# Patient Record
Sex: FEMALE | Race: WHITE | NOT HISPANIC OR LATINO | Employment: FULL TIME | URBAN - METROPOLITAN AREA
[De-identification: names, ages, dates, MRNs, and addresses within clinical notes are randomized per-mention and may not be internally consistent; named-entity substitution may affect disease eponyms.]

---

## 2019-05-08 ENCOUNTER — OFFICE VISIT (OUTPATIENT)
Dept: ORTHOPEDIC SURGERY | Facility: CLINIC | Age: 59
End: 2019-05-08

## 2019-05-08 VITALS — HEART RATE: 98 BPM | OXYGEN SATURATION: 98 % | WEIGHT: 163.14 LBS | HEIGHT: 61 IN | BODY MASS INDEX: 30.8 KG/M2

## 2019-05-08 DIAGNOSIS — M77.42 METATARSALGIA OF LEFT FOOT: ICD-10-CM

## 2019-05-08 DIAGNOSIS — M21.6X2 ACQUIRED METATARSUS VARUS OF LEFT FOOT: ICD-10-CM

## 2019-05-08 DIAGNOSIS — M79.672 LEFT FOOT PAIN: Primary | ICD-10-CM

## 2019-05-08 DIAGNOSIS — I87.8 VENOUS STASIS: ICD-10-CM

## 2019-05-08 PROCEDURE — 99203 OFFICE O/P NEW LOW 30 MIN: CPT | Performed by: ORTHOPAEDIC SURGERY

## 2019-05-08 RX ORDER — DICLOFENAC SODIUM 75 MG/1
75 TABLET, DELAYED RELEASE ORAL 2 TIMES DAILY
Refills: 1 | COMMUNITY
Start: 2019-03-10 | End: 2019-08-21

## 2019-05-08 RX ORDER — ESTRADIOL 0.05 MG/D
FILM, EXTENDED RELEASE TRANSDERMAL
Refills: 2 | COMMUNITY
Start: 2019-03-26 | End: 2023-01-03 | Stop reason: ALTCHOICE

## 2019-05-08 RX ORDER — CHLORAL HYDRATE 500 MG
1000 CAPSULE ORAL
COMMUNITY

## 2019-05-08 NOTE — PROGRESS NOTES
NEW PATIENT    Patient: Filomena Nixon  : 1960    Primary Care Provider: Kaushal Ledbetter MD    Requesting Provider: As above    Pain of the Left Foot      History    Chief Complaint: Left foot pain    History of Present Illness: This is a very pleasant 59-year-old woman who I am seen in the past for plantar fasciitis, .  She is here with a new problem.  She has a 2-month history of pain under the left forefoot.  She reports it feels as if it is swollen, it feels as if she is walking on something.  It is worse barefoot, it is better with padding.  She has not had any particular treatment.  She wears good shoes.  She rates the pain as 4-5 out of 10.  No history of injury, no change in activity.  She is an .    Current Outpatient Medications on File Prior to Visit   Medication Sig Dispense Refill   • aspirin 81 MG tablet aspirin 81 mg tablet   Daily     • diclofenac (VOLTAREN) 75 MG EC tablet Take 75 mg by mouth 2 (Two) Times a Day.  1   • estradiol (MINIVELLE, VIVELLE-DOT) 0.05 MG/24HR patch APPLY PATCH TWICE WEEKLY  2   • Misc Natural Products (OSTEO BI-FLEX JOINT SHIELD PO) Take  by mouth.     • MULTIPLE VITAMIN PO Multiple Vitamin capsule   Daily     • Omega-3 Fatty Acids (FISH OIL) 1000 MG capsule capsule Take  by mouth Daily With Breakfast.     • OMEPRAZOLE PO omeprazole 20 mg capsule,delayed release   one po daily     • Psyllium (GENFIBER PO) Genfiber (psyllium-dextrose) oral powder   As Directed     • TURMERIC PO Take  by mouth.       No current facility-administered medications on file prior to visit.       Allergies   Allergen Reactions   • Codeine Hives      Past Medical History:   Diagnosis Date   • Osteoarthritis      Past Surgical History:   Procedure Laterality Date   • HYSTERECTOMY     • KNEE SURGERY Left     arthroscopy     Family History   Problem Relation Age of Onset   • Osteoarthritis Mother    • Diabetes Mother    • Hypertension Father       Social History  "    Socioeconomic History   • Marital status:      Spouse name: Not on file   • Number of children: Not on file   • Years of education: Not on file   • Highest education level: Not on file   Tobacco Use   • Smoking status: Never Smoker   • Smokeless tobacco: Never Used   Substance and Sexual Activity   • Alcohol use: Yes   • Drug use: No   • Sexual activity: Defer        Review of Systems   Constitutional: Negative.    HENT: Negative.    Eyes: Negative.    Respiratory: Negative.    Cardiovascular: Negative.    Gastrointestinal: Negative.    Endocrine: Negative.    Genitourinary: Negative.    Musculoskeletal: Positive for arthralgias and joint swelling.   Skin: Negative.    Allergic/Immunologic: Negative.    Neurological: Negative.    Hematological: Negative.    Psychiatric/Behavioral: Negative.        The following portions of the patient's history were reviewed and updated as appropriate: allergies, current medications, past family history, past medical history, past social history, past surgical history and problem list.    Physical Exam:   Pulse 98   Ht 154.9 cm (61\")   Wt 74 kg (163 lb 2.3 oz)   SpO2 98%   BMI 30.83 kg/m²   GENERAL: Body habitus: overweight    Lower extremity edema: Right: trace; Leftt: trace    Varicose veins:  Right: mild; Left: mild    Gait: normal     Mental Status:  awake and alert; oriented to person, place, and time    Voice:  clear  SKIN:  Normal and warm and dry    Hair Growth:  Right:normal; Left:  normal  NAILS: Toenails: normal  HEENT: Head: Normocephalic, atraumatic,  without obvious abnormality.  eye: normal external eye, no icterus  ears: normal external ears  nose: normal external nose  pharynx: dental hygiene adequate  PULM:  Repiratory effort normal  CV:  Dorsalis Pedis:  Right: 2+; Left:2+    Posterior Tibial: Right:2+; Left:2+    Capillary Refill:  Brisk  MSK:  Hand:left handed and sensation intact      Tibia:  Right:  tender over subcutaneous border; Left:  tender " over subcutaneous border      Ankle:  Right: non tender, ROM  normal and symmetric and motor function  normal; Left:  non tender, ROM  normal and symmetric and motor function  normal      Foot:  Right:  non tender, ROM  normal and motor function  normal; Left:  Tender in the left second metatarsal phalangeal joint with swelling, mildly tender with some swelling in the third.  No significant hammertoe deformity.  Mild hallux valgus metatarsus primus varus, no tenderness over the bunion.  No other tenderness.      NEURO: Heel Walking:  Right:  normal; Left:  normal    Toe Walking:  Right:  normal; Left:  limited ability, painful     [place]-Mario 5.07 monofilament test: normal    Lower extremity sensation: intact     Reflexes:  Biceps:  Right:  1+; Left:  1+           Quads:  Right:  2+; Left:  2+           Ankle:  Right:  1+; Left:  1+      Calf Atrophy:none    Motor Function: all 5/5         Medical Decision Making    Data Review:   ordered and reviewed x-rays today    Assessment and Plan/ Diagnosis/Treatment options:   1. Metatarsalgia of left foot  She has synovitis of the left second and third metatarsal phalangeal joints.  We call this early hammertoe problem.  I explained to the patient that this is a very common problem, it is commonly hereditary but is made worse by shoe wear.  It commonly presents as a feeling of walking on a marble, or a rock.  It is generally worse barefoot, feels better with padding .  I can be hard to distinguish this from neuroma pain, but neuroma pain is generally worse in a shoe and better barefoot.    If untreated, it can lead to severe deformity of the toe.      I explained the problem to the patient, that the swelling in the joint leads to loosening of the ligaments and gradual deformity.  I explained that the toe can move directly up or sideways and become a crossover toe.  I explained we can call this synovitis, metatarsalgia, early hammertoe, early crossover toe, the result  is the same.  The goal of treatment is to prevent severe deformity.  We cannot change the deformity that has already occurred, but we can keep it from getting worse.  I explained that if we did an MRI, we would see stress in the bones on both sides of the joint, we would see fluid in the joint, and we might even see tears in the plantar plate.  However this does not change treatment.  Therefore I do not think an MRI is indicated.    I explained it can take 6-12 months for the pain to resolve.  When the pain is gone, the toe will no longer deform.    The treatment is done in a step-wise fashion.  The first stage is three fold: 1. splinting the joint, I showed them how to use either tape or a 2 loop Budin splint.  They should experiment and use whichever is comfortable.  2. custom orthotic to relieve pressure on the joint- I gave them a prescription 3. antiinflammatory to decrease the inflammation.      The second stage is a cortisone injection I do not do this the first time because of the increased risk that the toe will dislocate after injection.  I explained that sometimes these joints can spontaneously dislocate.   .      Surgery is only a very last resort.    I will see her in 3 months with standing 2 views of the foot    2. Acquired metatarsus varus of left foot  She does have a bunion but it is not painful    3. Venous stasis  She should wear support stockings daily, we discussed what type and where to find him

## 2019-08-21 ENCOUNTER — OFFICE VISIT (OUTPATIENT)
Dept: ORTHOPEDIC SURGERY | Facility: CLINIC | Age: 59
End: 2019-08-21

## 2019-08-21 VITALS — HEIGHT: 61 IN | BODY MASS INDEX: 31.15 KG/M2 | WEIGHT: 165 LBS

## 2019-08-21 DIAGNOSIS — M77.42 METATARSALGIA OF LEFT FOOT: ICD-10-CM

## 2019-08-21 DIAGNOSIS — I87.8 VENOUS STASIS: ICD-10-CM

## 2019-08-21 DIAGNOSIS — M21.6X2 ACQUIRED METATARSUS VARUS OF LEFT FOOT: Primary | ICD-10-CM

## 2019-08-21 PROCEDURE — 99212 OFFICE O/P EST SF 10 MIN: CPT | Performed by: ORTHOPAEDIC SURGERY

## 2019-08-21 RX ORDER — MELOXICAM 7.5 MG/1
7.5 TABLET ORAL 2 TIMES DAILY
COMMUNITY
End: 2021-10-06

## 2019-08-21 NOTE — PROGRESS NOTES
ESTABLISHED PATIENT    Patient: Filomena Nixon  : 1960    Primary Care Provider: Kaushal Ledbetter MD    Requesting Provider: As above    Follow-up (3 month recheck - Metatarsalgia of left foot;  Acquired metatarsus varus of left foot; Venous Statis)      History    Chief Complaint: Left foot pain    History of Present Illness: She returns reporting she is much improved.  She only wears the Budin splint intermittently when it sore.  Overall most of her pain is gone.    Current Outpatient Medications on File Prior to Visit   Medication Sig Dispense Refill   • aspirin 81 MG tablet aspirin 81 mg tablet   Daily     • estradiol (MINIVELLE, VIVELLE-DOT) 0.05 MG/24HR patch APPLY PATCH TWICE WEEKLY  2   • meloxicam (MOBIC) 7.5 MG tablet Take 7.5 mg by mouth 2 (Two) Times a Day.     • Misc Natural Products (OSTEO BI-FLEX JOINT SHIELD PO) Take  by mouth.     • MULTIPLE VITAMIN PO Multiple Vitamin capsule   Daily     • Omega-3 Fatty Acids (FISH OIL) 1000 MG capsule capsule Take  by mouth Daily With Breakfast.     • OMEPRAZOLE PO omeprazole 20 mg capsule,delayed release   one po daily     • Psyllium (GENFIBER PO) Genfiber (psyllium-dextrose) oral powder   As Directed     • TURMERIC PO Take  by mouth.     • [DISCONTINUED] diclofenac (VOLTAREN) 75 MG EC tablet Take 75 mg by mouth 2 (Two) Times a Day.  1     No current facility-administered medications on file prior to visit.       Allergies   Allergen Reactions   • Codeine Hives      Past Medical History:   Diagnosis Date   • Osteoarthritis      Past Surgical History:   Procedure Laterality Date   • HYSTERECTOMY     • KNEE SURGERY Left     arthroscopy     Family History   Problem Relation Age of Onset   • Osteoarthritis Mother    • Diabetes Mother    • Hypertension Father       Social History     Socioeconomic History   • Marital status:      Spouse name: Not on file   • Number of children: Not on file   • Years of education: Not on file   • Highest education level:  "Not on file   Tobacco Use   • Smoking status: Never Smoker   • Smokeless tobacco: Never Used   Substance and Sexual Activity   • Alcohol use: Yes   • Drug use: No   • Sexual activity: Defer        Review of Systems   Constitutional: Negative.    HENT: Negative.    Eyes: Negative.    Respiratory: Negative.    Cardiovascular: Negative.    Gastrointestinal: Negative.    Endocrine: Negative.    Genitourinary: Negative.    Musculoskeletal: Positive for arthralgias.   Skin: Negative.    Allergic/Immunologic: Negative.    Neurological: Negative.    Hematological: Negative.    Psychiatric/Behavioral: Negative.        The following portions of the patient's history were reviewed and updated as appropriate: allergies, current medications, past family history, past medical history, past social history, past surgical history and problem list.    Physical Exam:   Ht 154.9 cm (60.98\")   Wt 74.8 kg (165 lb)   BMI 31.19 kg/m²   GENERAL: Body habitus: overweight    Lower extremity edema: Left: 1+ pitting; Right: 1+ pitting    Gait: normal     Mental Status:  awake and alert; oriented to person, place, and time  MSK:      Foot:  Right:  non tender; Left:  No tenderness in the left foot, no tenderness in the second MTPJ    NEURO Sensation:  intact    Medical Decision Making    Data Review:   ordered and reviewed x-rays today    Assessment/Plan/Diagnosis/Treatment Options:   1.  Metatarsalgia left foot  Most of her symptoms have resolved.  She has not developed any severe deformity.  I think it is okay to use the Budin splint as needed.  I will be happy to see her anytime      2.  Venous stasis, she really should wear support stockings daily                            "

## 2021-10-05 NOTE — PROGRESS NOTES
[facility]  [address]  [place] [address]  [phone]    Name: Filomena Nixon  : 1960  Patient Care Team:  Kaushal Ledbetter MD as PCP - General (Family Medicine)    Chief Complaint   Patient presents with   • Obesity   • Consult   • Nutrition Counseling        HPI:   Ms. Filomena Nixon is a 61 y.o. female presents for initiation of medically supervised weight loss. She has tried other methods/programs to lose weight including:  Weight Watchers and Joining a gym and has not ever tried medication to assist with weight loss..     Lifetime non-pregnant maximum weight: 181  Weight 1 year ago: 170  Weight 5 years ago: 140    The following seem to sabotage weight loss efforts:Lack of time for planning & self, comfort/stress eating, enjoyment of food, social events, eating late, waking up eating, liquid calories such as alcohol, mindless eating (snacking while working or watching TV), eating too fast, always hungry, boredom eating, specific cravings like carbohydrates and convenience food (fast food) (mixture of everything)  Recent Weight History:   Wt Readings from Last 6 Encounters:   10/06/21 76.9 kg (169 lb 8 oz)   19 74.8 kg (165 lb)   19 74 kg (163 lb 2.3 oz)       Review of Systems:   Review of Systems   Constitutional: Negative for fatigue.        Positive for weight gain   HENT: Negative for trouble swallowing.         Negative for throat swelling   Respiratory: Negative for shortness of breath and wheezing.         Negative for snoring   Cardiovascular: Negative for chest pain, palpitations and leg swelling.   Gastrointestinal: Positive for constipation, diarrhea, GERD and indigestion. Negative for abdominal pain.   Endocrine: Negative for cold intolerance, heat intolerance, polydipsia, polyphagia and polyuria.        Negative for loss of hair  Negative for hirsutism     Genitourinary:        Denies menstrual irregularities  "  Musculoskeletal: Negative for arthralgias.        Denies exercise limitations  Denies chronic pain   Skin: Negative for dry skin.        Negative for acne   Neurological: Negative for headache and memory problem.        Negative for paresthesias   Psychiatric/Behavioral: Positive for depressed mood. Negative for self-injury, sleep disturbance and suicidal ideas. The patient is nervous/anxious.        Allergies   Allergen Reactions   • Codeine Hives and Itching       Exercise:      Current FITT Score      Frequency   Intensity Time Strength Training   [x]   0 None  [x]   0 None  [x]   0 None  [x]   0 None    []   1 (1-2x/week) []   1 (light) []   1 (<10 min) []   1 (1x/week)   []   2 (3-5x/week) []   2 (moderate) []   2 (10-20 min) []   2 (2x/week)   []   3 (daily)   []   3 (moderately hard)  []   4 (very hard) []   3 (20-30 min)  []   4 (>30 min) []   3 (3-4x/week)       Water: 0oz/day  Alcohol: CAGE is positive   Other beverages:  coffee, diet soda, alcohol    PHQ-9 Total Score:   2  VS   Vitals:    10/06/21 0839   BP: 126/68   BP Location: Left arm   Patient Position: Sitting   Cuff Size: Adult   Pulse: 86   Resp: 18   Temp: 96.6 °F (35.9 °C)   TempSrc: Temporal   SpO2: 98%   Weight: 76.9 kg (169 lb 8 oz)   Height: 154.9 cm (61\")       Start Weight/BMI: 169.5  %fat: 42.8  Total body water (in lbs): 71.0    Measurements (in inches)  Neck: 13.5  Chest: 40  Waist: 36  Hips: 40.6  Thighs: 38    Physical Exam  Vitals reviewed.   Constitutional:       Appearance: She is obese. She is not ill-appearing.   HENT:      Head:      Comments: masked  Neck:      Thyroid: No thyroid mass, thyromegaly or thyroid tenderness.   Cardiovascular:      Rate and Rhythm: Normal rate and regular rhythm.   Pulmonary:      Effort: Pulmonary effort is normal.      Breath sounds: Normal breath sounds and air entry.   Neurological:      Mental Status: She is alert.   Psychiatric:         Attention and Perception: Attention and perception " normal.         Behavior: Behavior is cooperative.          ASSESSMENT/PLAN:   Patient viewed educational videos. Topics included obesity as a disease, nutritional education on food groups, exercise, and medications. Patient was instructed in adequate protein, controlled carb and controlled fat intake.   Patient received instructions on using the medicines as a tool in controlling their weight with nutritional and behavioral changes. Risks and benefits were discussed. I believe the potential benefits of medication helping to decrease weight outweighs the risks. Patient is to try nutritonal/behavioral changes only first.   Patient received our clinic education booklet.   Our patient consent form was reviewed including potential risks of weight loss. We also reviewed our confidentiality and HIPPA statements. Patients current FITT score was reviewed along with current capability for exercise tolerance and a patient will work towards a FITT score of:     Frequency   Intensity Time Strength Training   []   0 None  []   0 None  []   0 None  []   0 None    []   1 (1-2x/week) []   1 (light) []   1 (<10 min) [x]   1 (1x/week)   [x]   2 (3-5x/week) [x]   2 (moderate) []   2 (10-20 min) []   2 (2x/week)   []   3 (daily)   []   3 (moderately hard)  []   4 (very hard) []   3 (20-30 min)  []   4 (>30 min) []   3 (3-4x/week)       Patient's past medical history was reviewed in detail and barriers to weight loss were identified and discussed. Past efforts at weight reduction on their own as well as under physician supervision were documented and discussed.  I advised patient to continue routine care with their Primary Care Provider.     Nutritional recommendations and goals were reviewed including Calories: 1000 daily adjusted for exercise calories burnt, Protein:35% daily, Net carbs (total carb - fiber) 30% daily.    Diagnoses and all orders for this visit:    1. Obesity, Class I, BMI 30-34.9 (Primary)  Assessment &  Plan:  Patient's (Body mass index is 32.03 kg/m².) indicates that they are obese (BMI >30) with health conditions that include obstructive sleep apnea, dyslipidemias, GERD and pre-diabetes . Weight is newly identified. BMI is is above average; BMI management plan is completed.   --My fitness pal was set up in office she has previously used this before.  Current basal metabolic rate 1399 kcal.  Calorie goal set up to 1000 adjusted for activity and exercise.  #1 goal over the next 2 weeks is starting to get an accurate calorie count for the day.  Asked patient to start tracking with a goal of tracking 14 out of 14 days these next 2 weeks.  Patient asked to bring in food journal to next office visit for brief review.  --Medication eligibility form provided for patient and asked her to check coverage with her insurance.  In particular we are looking for coverage for GLP-1's as she does have prediabetes currently.   --Patient is somewhat interested in pharmacology.  Discussed that if we start this medication it would be at her 2-week follow-up after labs are reviewed.  --Fasting labs from PCP reviewed in office and additional fasting labs ordered to be drawn prior to next appointment.      Orders:  -     Comprehensive Metabolic Panel; Future  -     Insulin, Total; Future  -     Hemoglobin A1c; Future  -     Vitamin D 25 Hydroxy; Future    2. Pre-diabetes  -     Comprehensive Metabolic Panel; Future  -     Insulin, Total; Future  -     Hemoglobin A1c; Future  -     Vitamin D 25 Hydroxy; Future    3. Vitamin D deficiency  -     Comprehensive Metabolic Panel; Future  -     Insulin, Total; Future  -     Hemoglobin A1c; Future  -     Vitamin D 25 Hydroxy; Future    4. Encounter for drug screening  -     Urine Drug Screen - Urine, Clean Catch; Future       Treatment Plan  Non-Weight Loss Goals: Improved blood glucose: has been addressed., Improved cholesterol: has been addressed., Improved mobility: has been addressed. ,  Improved energy: has been addressed.  and Improved sleep apnea: has been addressed.   Initial goal of 5-10% loss of beginning body weight  Goals:  1. Personal Goals: become more active.   2. Start changing nutrition to examples given to meet nutritional macronutrient individual ranges discussed. Titrate down 500 calories every 5 days as tolerated until range met. Titrate down 50g carbohydrates every 5 days as tolerated until range met. Inc exercise to the individualized FITT score discussed. Start to keep a food journal and bring into next visit in 2 weeks for review. Practice the behavioral modification technique of mindful eating. Take one MVI daily and 2000mg fish oil daily. Take other medications and supplements as directed.    I spent 60 minutes on this date of service. This time includes time spent by me in the following activities:preparing for the visit, counseling and educating the patient/family/caregiver, ordering medications, tests, or procedures and documenting information in the medical record.    Return in about 4 weeks (around 11/3/2021).      JANETTE Stone

## 2021-10-06 ENCOUNTER — OFFICE VISIT (OUTPATIENT)
Dept: BARIATRICS/WEIGHT MGMT | Facility: CLINIC | Age: 61
End: 2021-10-06

## 2021-10-06 VITALS
TEMPERATURE: 96.6 F | DIASTOLIC BLOOD PRESSURE: 68 MMHG | OXYGEN SATURATION: 98 % | RESPIRATION RATE: 18 BRPM | HEIGHT: 61 IN | SYSTOLIC BLOOD PRESSURE: 126 MMHG | WEIGHT: 169.5 LBS | BODY MASS INDEX: 32 KG/M2 | HEART RATE: 86 BPM

## 2021-10-06 DIAGNOSIS — E66.9 OBESITY, CLASS I, BMI 30-34.9: Primary | ICD-10-CM

## 2021-10-06 DIAGNOSIS — E55.9 VITAMIN D DEFICIENCY: ICD-10-CM

## 2021-10-06 DIAGNOSIS — Z02.83 ENCOUNTER FOR DRUG SCREENING: ICD-10-CM

## 2021-10-06 DIAGNOSIS — R73.03 PRE-DIABETES: ICD-10-CM

## 2021-10-06 PROBLEM — E66.811 OBESITY, CLASS I, BMI 30-34.9: Status: ACTIVE | Noted: 2021-10-06

## 2021-10-06 PROBLEM — E78.5 HYPERLIPEMIA: Status: ACTIVE | Noted: 2021-10-06

## 2021-10-06 PROBLEM — G47.30 SLEEP APNEA: Status: ACTIVE | Noted: 2021-10-06

## 2021-10-06 PROBLEM — K21.9 GERD (GASTROESOPHAGEAL REFLUX DISEASE): Status: ACTIVE | Noted: 2021-10-06

## 2021-10-06 PROCEDURE — 99205 OFFICE O/P NEW HI 60 MIN: CPT | Performed by: NURSE PRACTITIONER

## 2021-10-06 RX ORDER — BETAMETHASONE DIPROPIONATE 0.5 MG/G
OINTMENT TOPICAL
COMMUNITY
End: 2022-02-28 | Stop reason: ALTCHOICE

## 2021-10-06 RX ORDER — CHOLECALCIFEROL (VITAMIN D3) 1250 MCG
1 CAPSULE ORAL DAILY
COMMUNITY

## 2021-10-06 RX ORDER — ATORVASTATIN CALCIUM 10 MG/1
10 TABLET, FILM COATED ORAL
COMMUNITY
Start: 2021-09-01

## 2021-10-06 RX ORDER — METHYLPREDNISOLONE 4 MG/1
4 TABLET ORAL DAILY
COMMUNITY
Start: 2021-09-30 | End: 2021-10-20

## 2021-10-06 RX ORDER — ETODOLAC 500 MG/1
500 TABLET, FILM COATED ORAL 2 TIMES DAILY
COMMUNITY
Start: 2021-09-07

## 2021-10-06 NOTE — ASSESSMENT & PLAN NOTE
Patient's (Body mass index is 32.03 kg/m².) indicates that they are obese (BMI >30) with health conditions that include obstructive sleep apnea, dyslipidemias, GERD and pre-diabetes . Weight is newly identified. BMI is is above average; BMI management plan is completed.   --My fitness pal was set up in office she has previously used this before.  Current basal metabolic rate 1399 kcal.  Calorie goal set up to 1000 adjusted for activity and exercise.  #1 goal over the next 2 weeks is starting to get an accurate calorie count for the day.  Asked patient to start tracking with a goal of tracking 14 out of 14 days these next 2 weeks.  Patient asked to bring in food journal to next office visit for brief review.  --Medication eligibility form provided for patient and asked her to check coverage with her insurance.  In particular we are looking for coverage for GLP-1's as she does have prediabetes currently.   --Patient is somewhat interested in pharmacology.  Discussed that if we start this medication it would be at her 2-week follow-up after labs are reviewed.  --Fasting labs from PCP reviewed in office and additional fasting labs ordered to be drawn prior to next appointment.

## 2021-10-07 ENCOUNTER — LAB (OUTPATIENT)
Dept: LAB | Facility: HOSPITAL | Age: 61
End: 2021-10-07

## 2021-10-07 DIAGNOSIS — R73.03 PRE-DIABETES: ICD-10-CM

## 2021-10-07 DIAGNOSIS — Z02.83 ENCOUNTER FOR DRUG SCREENING: ICD-10-CM

## 2021-10-07 DIAGNOSIS — E66.9 OBESITY, CLASS I, BMI 30-34.9: ICD-10-CM

## 2021-10-07 DIAGNOSIS — E55.9 VITAMIN D DEFICIENCY: ICD-10-CM

## 2021-10-07 LAB
25(OH)D3 SERPL-MCNC: 40.4 NG/ML (ref 30–100)
ALBUMIN SERPL-MCNC: 5.1 G/DL (ref 3.5–5.2)
ALBUMIN/GLOB SERPL: 2.3 G/DL
ALP SERPL-CCNC: 66 U/L (ref 39–117)
ALT SERPL W P-5'-P-CCNC: 21 U/L (ref 1–33)
AMPHET+METHAMPHET UR QL: NEGATIVE
AMPHETAMINES UR QL: NEGATIVE
ANION GAP SERPL CALCULATED.3IONS-SCNC: 11.6 MMOL/L (ref 5–15)
AST SERPL-CCNC: 16 U/L (ref 1–32)
BARBITURATES UR QL SCN: NEGATIVE
BENZODIAZ UR QL SCN: NEGATIVE
BILIRUB SERPL-MCNC: 1.1 MG/DL (ref 0–1.2)
BUN SERPL-MCNC: 17 MG/DL (ref 8–23)
BUN/CREAT SERPL: 18.3 (ref 7–25)
BUPRENORPHINE SERPL-MCNC: NEGATIVE NG/ML
CALCIUM SPEC-SCNC: 9.8 MG/DL (ref 8.6–10.5)
CANNABINOIDS SERPL QL: NEGATIVE
CHLORIDE SERPL-SCNC: 104 MMOL/L (ref 98–107)
CO2 SERPL-SCNC: 25.4 MMOL/L (ref 22–29)
COCAINE UR QL: NEGATIVE
CREAT SERPL-MCNC: 0.93 MG/DL (ref 0.57–1)
GFR SERPL CREATININE-BSD FRML MDRD: 61 ML/MIN/1.73
GLOBULIN UR ELPH-MCNC: 2.2 GM/DL
GLUCOSE SERPL-MCNC: 95 MG/DL (ref 65–99)
HBA1C MFR BLD: 5.77 % (ref 4.8–5.6)
METHADONE UR QL SCN: NEGATIVE
OPIATES UR QL: NEGATIVE
OXYCODONE UR QL SCN: NEGATIVE
PCP UR QL SCN: NEGATIVE
POTASSIUM SERPL-SCNC: 4.3 MMOL/L (ref 3.5–5.2)
PROPOXYPH UR QL: NEGATIVE
PROT SERPL-MCNC: 7.3 G/DL (ref 6–8.5)
SODIUM SERPL-SCNC: 141 MMOL/L (ref 136–145)
TRICYCLICS UR QL SCN: NEGATIVE

## 2021-10-07 PROCEDURE — 80053 COMPREHEN METABOLIC PANEL: CPT

## 2021-10-07 PROCEDURE — 83036 HEMOGLOBIN GLYCOSYLATED A1C: CPT

## 2021-10-07 PROCEDURE — 80306 DRUG TEST PRSMV INSTRMNT: CPT

## 2021-10-07 PROCEDURE — 82306 VITAMIN D 25 HYDROXY: CPT

## 2021-10-07 PROCEDURE — 36415 COLL VENOUS BLD VENIPUNCTURE: CPT

## 2021-10-07 PROCEDURE — 83525 ASSAY OF INSULIN: CPT

## 2021-10-08 LAB — INSULIN SERPL-ACNC: 9.8 UIU/ML (ref 2.6–24.9)

## 2021-10-11 PROBLEM — R73.09 ELEVATED HEMOGLOBIN A1C: Status: ACTIVE | Noted: 2021-10-11

## 2021-10-19 RX ORDER — SEMAGLUTIDE 1.34 MG/ML
0.25 INJECTION, SOLUTION SUBCUTANEOUS WEEKLY
Qty: 1 PEN | Refills: 0 | Status: CANCELLED | OUTPATIENT
Start: 2021-10-19 | End: 2021-11-18

## 2021-10-19 NOTE — PROGRESS NOTES
"[facility] [place] [facility]  [address]  [place] [address]    Name: Filomena Nixon  : 1960    Chief Complaint   Patient presents with   • Obesity   • Follow-up        Allergies   Allergen Reactions   • Codeine Hives and Itching       Vitals:    10/20/21 0724   BP: 122/64   BP Location: Left arm   Patient Position: Sitting   Cuff Size: Adult   Pulse: 88   Resp: 18   Temp: 97.8 °F (36.6 °C)   TempSrc: Temporal   SpO2: 98%   Weight: 76.4 kg (168 lb 8 oz)   Height: 154.9 cm (61\")       Patient is currently taking these medications prescribed by this office:  None. Initial follow up.     Recent Weight History:   Wt Readings from Last 6 Encounters:   10/20/21 76.4 kg (168 lb 8 oz)   10/06/21 76.9 kg (169 lb 8 oz)   19 74.8 kg (165 lb)   19 74 kg (163 lb 2.3 oz)       Last office visit weight (at [facility] visit) : 169.5  Change in weight since last visit: -1  Start Weight: 169.5  Total Loss%: -0.59  Loss of BBW: -1    Body composition analysis completed and showed:  %body fat: 40.5  Total fat mass (in lbs): 68.0  Fat free mass (in lbs): 100.5  Total body water (in lb): 73.5  BMR:1395    Measurements (in inches)  Waist: 36.5    The patient is exercising with a FITT score of:    Frequency   Intensity Time Strength Training   []   0 None  []   0 None  []   0 None  [x]   0 None    []   1 (1-2x/week) [x]   1 (light) []   1 (<10 min) []   1 (1x/week)   [x]   2 (3-5x/week) []   2 (moderate) [x]   2 (10-20 min) []   2 (2x/week)   []   3 (daily)   []   3 (moderately hard)  []   4 (very hard) []   3 (20-30 min)  []   4 (>30 min) []   3 (3-4x/week)       Office visit for Filomena Nixon, a 61 y.o. female, established patient for medical weight management. Patient is unsure with weight loss progress.    Hunger control has remained unchanged The patient is exercising. The patient is using a food journal. The patient is checking weight regularly. The patient rates current efforts " as 5 out of 10. Body mass index is 31.84 kg/m². and has not reached treatment goal.     Review of Systems:  Review of Systems   Constitutional: Negative for fatigue.   Eyes: Negative for visual disturbance.   Cardiovascular: Negative for chest pain and palpitations.   Gastrointestinal: Negative for abdominal pain, constipation, diarrhea, nausea and GERD.   Neurological: Negative for memory problem.        Parasthesias negative   Psychiatric/Behavioral: Negative for sleep disturbance and depressed mood. The patient is not nervous/anxious.        Physical Exam:  Physical Exam  Vitals reviewed.   Constitutional:       Appearance: She is well-developed.      Comments: overweight   HENT:      Head:      Comments: masked  Pulmonary:      Effort: Pulmonary effort is normal.   Neurological:      Mental Status: She is alert.   Psychiatric:         Attention and Perception: Attention normal.         Mood and Affect: Mood normal.         Speech: Speech normal.         Behavior: Behavior normal.          ASSESSMENT/PLAN:   Diagnoses and all orders for this visit:    1. Obesity, Class I, BMI 30-34.9 (Primary)  Assessment & Plan:  Patient's (Body mass index is 31.84 kg/m².) indicates that they are obese (BMI >30) with health conditions that include obstructive sleep apnea, dyslipidemias, GERD, osteoarthritis and pre-diabetes . Weight is newly identified.   --Patient did do a great job on tracking tracking every day the past 2 weeks.  On a brief review we did notice that she is staying within her calorie goal.  One thing she plans to work on this coming month is to reach her protein goal.  --Currently having some right knee swelling with likely MRI which is restricting her exercise currently.  --We started Saxenda at this appointment and sample was provided with injection instructions at office.  [facility] and Zofran sent to [facility] of her choice sample lot number  [id number] expiration 4/2023    Orders:  -      Liraglutide (SAXENDA) 18 MG/3ML injection pen; Inject 3 mg under the skin into the appropriate area as directed Daily for 30 days. Titrate up an instructed starting at 0.6mg daily  Dispense: 5 pen; Refill: 0  -     Insulin Pen Needle (Pen Needles) 32G X 4 MM misc; 1 each As Needed (with medication) for up to 100 days.  Dispense: 100 each; Refill: 0    2. Elevated hemoglobin A1c    Other orders  -     ondansetron (Zofran) 8 MG tablet; Take 1 tablet by mouth Every 8 (Eight) Hours As Needed for Nausea or Vomiting for up to 30 days.  Dispense: 30 tablet; Refill: 1      I have instructed the patient to continue with pursuit of medical weight loss as a part of this program. Patient does meet criteria for use of anorectics at this time as BMI >27 and body fat percentage >30%.      I spent 40 minutes on this date of service. This time includes time spent by me in the following activities:preparing for the visit, counseling and educating the patient/family/caregiver, ordering medications, tests, or procedures and documenting information in the medical record.    Plan of care reviewed with the patient at the conclusion of today's visit. We discussed the risks, benefits, and limitations of treatments. Continue medications and OTC supplements as discussed. Patient verbalizes understanding of and agreement with management plan.      Return in about 4 weeks (around 11/17/2021).      JANETTE Stone

## 2021-10-20 ENCOUNTER — OFFICE VISIT (OUTPATIENT)
Dept: BARIATRICS/WEIGHT MGMT | Facility: CLINIC | Age: 61
End: 2021-10-20

## 2021-10-20 VITALS
HEIGHT: 61 IN | SYSTOLIC BLOOD PRESSURE: 122 MMHG | OXYGEN SATURATION: 98 % | WEIGHT: 168.5 LBS | RESPIRATION RATE: 18 BRPM | DIASTOLIC BLOOD PRESSURE: 64 MMHG | TEMPERATURE: 97.8 F | BODY MASS INDEX: 31.81 KG/M2 | HEART RATE: 88 BPM

## 2021-10-20 DIAGNOSIS — E66.9 OBESITY, CLASS I, BMI 30-34.9: Primary | ICD-10-CM

## 2021-10-20 DIAGNOSIS — R73.09 ELEVATED HEMOGLOBIN A1C: ICD-10-CM

## 2021-10-20 PROCEDURE — 99215 OFFICE O/P EST HI 40 MIN: CPT | Performed by: NURSE PRACTITIONER

## 2021-10-20 RX ORDER — PEN NEEDLE, DIABETIC 30 GX3/16"
1 NEEDLE, DISPOSABLE MISCELLANEOUS AS NEEDED
Qty: 100 EACH | Refills: 0 | Status: SHIPPED | OUTPATIENT
Start: 2021-10-20 | End: 2021-10-21 | Stop reason: SDUPTHER

## 2021-10-20 RX ORDER — ONDANSETRON HYDROCHLORIDE 8 MG/1
8 TABLET, FILM COATED ORAL EVERY 8 HOURS PRN
Qty: 30 TABLET | Refills: 1 | Status: SHIPPED | OUTPATIENT
Start: 2021-10-20 | End: 2021-10-21 | Stop reason: SDUPTHER

## 2021-10-20 NOTE — ASSESSMENT & PLAN NOTE
Patient's (Body mass index is 31.84 kg/m².) indicates that they are obese (BMI >30) with health conditions that include obstructive sleep apnea, dyslipidemias, GERD, osteoarthritis and pre-diabetes . Weight is newly identified.   --Patient did do a great job on tracking tracking every day the past 2 weeks.  On a brief review we did notice that she is staying within her calorie goal.  One thing she plans to work on this coming month is to reach her protein goal.  --Currently having some right knee swelling with likely MRI which is restricting her exercise currently.  --We started Saxenda at this appointment and sample was provided with injection instructions at office.  Rx sent to community pharmacy and Zofran sent to [facility] of her choice sample lot number  [id number] expiration 4/2023

## 2021-10-21 RX ORDER — PEN NEEDLE, DIABETIC 30 GX3/16"
1 NEEDLE, DISPOSABLE MISCELLANEOUS AS NEEDED
Qty: 100 EACH | Refills: 0 | Status: SHIPPED | OUTPATIENT
Start: 2021-10-21 | End: 2022-01-29

## 2021-10-21 RX ORDER — ONDANSETRON HYDROCHLORIDE 8 MG/1
8 TABLET, FILM COATED ORAL EVERY 8 HOURS PRN
Qty: 30 TABLET | Refills: 1 | Status: SHIPPED | OUTPATIENT
Start: 2021-10-21 | End: 2021-11-20

## 2021-11-15 NOTE — PROGRESS NOTES
"[facility] [place] [facility]  [address]  [place] [address]    Name: Filomena Nixon  : 1960    Chief Complaint   Patient presents with   • Obesity   • Follow-up        Allergies   Allergen Reactions   • Codeine Hives and Itching       Vitals:    21 0738   BP: 124/66   BP Location: Left arm   Patient Position: Sitting   Cuff Size: Adult   Pulse: 104   Resp: 18   Temp: 98.1 °F (36.7 °C)   TempSrc: Temporal   SpO2: 98%   Weight: 73 kg (161 lb)   Height: 154.9 cm (61\")       Patient is currently taking these medications prescribed by this office:  Saxenda     Recent Weight History:   Wt Readings from Last 6 Encounters:   21 73 kg (161 lb)   10/20/21 76.4 kg (168 lb 8 oz)   10/06/21 76.9 kg (169 lb 8 oz)   19 74.8 kg (165 lb)   19 74 kg (163 lb 2.3 oz)       Last office visit weight (at [facility] visit) : 168.5  Change in weight since last visit: -7.5  Start Weight: 169.5  Total Loss%: -5.01  Loss of BBW:-8.5    Body composition analysis completed and showed:  %body fat: 39.4  Total fat mass (in lbs): 63.5  Fat free mass (in lbs): 97.5  Total body water (in lb): 71.5  BMR: 1362     Measurements (in inches)  Waist: 35.5    The patient is exercising with a FITT score of:    Frequency   Intensity Time Strength Training   []   0 None  []   0 None  []   0 None  [x]   0 None    []   1 (1-2x/week) [x]   1 (light) []   1 (<10 min) []   1 (1x/week)   [x]   2 (3-5x/week) []   2 (moderate) [x]   2 (10-20 min) []   2 (2x/week)   []   3 (daily)   []   3 (moderately hard)  []   4 (very hard) []   3 (20-30 min)  []   4 (>30 min) []   3 (3-4x/week)       Office visit for Filomena Nixon, a 61 y.o. female, established patient for medical weight management. Patient is satisfied with weight loss progress.    Hunger control has improved The patient is exercising. The patient is using a food journal. The patient is checking weight regularly. The patient rates current efforts " as 8 out of 10. Body mass index is 30.42 kg/m². and has not reached treatment goal.     Review of Systems:  Review of Systems   Constitutional: Negative for fatigue.   Eyes: Negative for visual disturbance.   Cardiovascular: Negative for chest pain and palpitations.   Gastrointestinal: Negative for abdominal pain, constipation, diarrhea, nausea and GERD.   Neurological: Negative for dizziness, tremors, light-headedness, headache and memory problem.        Parasthesias negative   Psychiatric/Behavioral: Negative for sleep disturbance and depressed mood. The patient is not nervous/anxious.        Physical Exam:  Physical Exam  Vitals reviewed.   Constitutional:       Appearance: She is obese. She is not ill-appearing.   HENT:      Head:      Comments: masked  Pulmonary:      Effort: Pulmonary effort is normal.   Neurological:      Mental Status: She is alert.   Psychiatric:         Attention and Perception: Attention and perception normal.         Behavior: Behavior is cooperative.          ASSESSMENT/PLAN:   Diagnoses and all orders for this visit:    1. Obesity, Class I, BMI 30-34.9 (Primary)  Assessment & Plan:  Patient's (Body mass index is 30.42 kg/m².) indicates that they are obese (BMI >30) with health conditions that include obstructive sleep apnea, dyslipidemias, GERD and osteoarthritis . Weight is improving with treatment. BMI is is above average; BMI management plan is completed. We discussed low calorie, low carb based diet program, portion control and increasing exercise.   --Patient has had a significant weight loss since last month at 7.5 pounds.  --She did start Saxenda and has titrated up to 3.0 and noticing moderate nausea. Advised that if the side effects do become troublesome she can reduce the dose back to 2.4 or 1.8. She had used MiraLAX for couple weeks and did not feel like she needed afterwards. We discussed that likely she will need a lower dose even possibly 1/4 cup daily just to counteract  the side effects of the GLP-1.  --She did do a very good job of journaling and felt that she journaled all 30 days this past month but does not have her journal for a brief review as she has left it in the car. Continue this great effort.  --Has felt that she is increased her water but still feels that her water goal is a struggle as this is a new habit for her  --Currently has a torn meniscus in her right knee which is due for repair this month. She has been able to walk regularly around 15 minutes daily.  --Weight loss goal this month is 4 to 6 pounds.  She is close to reaching a BMI less than 30 and should be there by next office visit.      Orders:  -     Liraglutide (SAXENDA) 18 MG/3ML injection pen; Inject 3 mg under the skin into the appropriate area as directed Daily for 30 days. Titrate up an instructed starting at 0.6mg daily  Dispense: 5 pen; Refill: 0    Other orders  -     polyethylene glycol (MIRALAX) 17 GM/SCOOP powder; Take 17 g by mouth Daily As Needed (constipation) for up to 30 days.  Dispense: 510 g; Refill: 0     I spent 30 minutes on this date of service. This time includes time spent by me in the following activities:preparing for the visit, counseling and educating the patient/family/caregiver, ordering medications, tests, or procedures and documenting information in the medical record.    Plan of care reviewed with the patient at the conclusion of today's visit. We discussed the risks, benefits, and limitations of treatments. Continue medications and OTC supplements as discussed. Patient verbalizes understanding of and agreement with management plan.      Return in about 4 weeks (around 12/15/2021).      JANETTE Stone

## 2021-11-17 ENCOUNTER — OFFICE VISIT (OUTPATIENT)
Dept: BARIATRICS/WEIGHT MGMT | Facility: CLINIC | Age: 61
End: 2021-11-17

## 2021-11-17 VITALS
HEART RATE: 104 BPM | WEIGHT: 161 LBS | RESPIRATION RATE: 18 BRPM | DIASTOLIC BLOOD PRESSURE: 66 MMHG | TEMPERATURE: 98.1 F | SYSTOLIC BLOOD PRESSURE: 124 MMHG | HEIGHT: 61 IN | OXYGEN SATURATION: 98 % | BODY MASS INDEX: 30.4 KG/M2

## 2021-11-17 DIAGNOSIS — E66.9 OBESITY, CLASS I, BMI 30-34.9: Primary | ICD-10-CM

## 2021-11-17 PROCEDURE — 99214 OFFICE O/P EST MOD 30 MIN: CPT | Performed by: NURSE PRACTITIONER

## 2021-11-17 RX ORDER — POLYETHYLENE GLYCOL 3350 17 G/17G
17 POWDER, FOR SOLUTION ORAL DAILY PRN
Qty: 510 G | Refills: 0 | Status: SHIPPED | OUTPATIENT
Start: 2021-11-17 | End: 2021-12-17

## 2021-11-17 NOTE — ASSESSMENT & PLAN NOTE
Patient's (Body mass index is 30.42 kg/m².) indicates that they are obese (BMI >30) with health conditions that include obstructive sleep apnea, dyslipidemias, GERD and osteoarthritis . Weight is improving with treatment. BMI is is above average; BMI management plan is completed. We discussed low calorie, low carb based diet program, portion control and increasing exercise.   --Patient has had a significant weight loss since last month at 7.5 pounds.  --She did start Saxenda and has titrated up to 3.0 and noticing moderate nausea. Advised that if the side effects do become troublesome she can reduce the dose back to 2.4 or 1.8. She had used MiraLAX for couple weeks and did not feel like she needed afterwards. We discussed that likely she will need a lower dose even possibly 1/4 cup daily just to counteract the side effects of the GLP-1.  --She did do a very good job of journaling and felt that she journaled all 30 days this past month but does not have her journal for a brief review as she has left it in the car. Continue this great effort.  --Has felt that she is increased her water but still feels that her water goal is a struggle as this is a new habit for her  --Currently has a torn meniscus in her right knee which is due for repair this month. She has been able to walk regularly around 15 minutes daily.  --Weight loss goal this month is 4 to 6 pounds.  She is close to reaching a BMI less than 30 and should be there by next office visit.

## 2021-12-28 NOTE — PROGRESS NOTES
"[facility] [place] [facility]  [address]  [place] [address]    Name: Filomena Nixon  : 1960    /70   Pulse 90   Ht 154.9 cm (61\")   Wt 71.2 kg (157 lb)   BMI 29.66 kg/m²     Chief Complaint   Patient presents with   • Follow-up   • Weight Loss        Allergies   Allergen Reactions   • Codeine Hives and Itching       Vitals:    21 0806   BP: 118/70   Pulse: 90   Weight: 71.2 kg (157 lb)   Height: 154.9 cm (61\")       Recent Weight History:   Wt Readings from Last 6 Encounters:   21 71.2 kg (157 lb)   21 73 kg (161 lb)   10/20/21 76.4 kg (168 lb 8 oz)   10/06/21 76.9 kg (169 lb 8 oz)   19 74.8 kg (165 lb)   19 74 kg (163 lb 2.3 oz)       Last office visit weight (at L visit) : 161  Change in weight since last visit: -4  Start Weight: 169       Measurements (in inches)  Waist: 36    Office visit for Filomena Nixon, a 61 y.o. female, established patient for medical weight management. Patient is satisfied with weight loss progress.    Hunger control has improved The patient is exercising. The patient is using a food journal. The patient is checking weight regularly. Trying to keep calories below 1000 daily. Changing insurance and unable to  saxenda till next month. Requesting sample till that point.  Body mass index is 29.66 kg/m². and has not reached treatment goal.     Review of Systems:  Review of Systems   All other systems reviewed and are negative.      Physical Exam:  Physical Exam  Vitals reviewed.   Constitutional:       Appearance: She is well-developed.      Comments: overweight   HENT:      Head:      Comments: masked  Pulmonary:      Effort: Pulmonary effort is normal.   Neurological:      Mental Status: She is alert.   Psychiatric:         Attention and Perception: Attention normal.         Mood and Affect: Mood normal.         Speech: Speech normal.         Behavior: Behavior normal.          ASSESSMENT/PLAN: "   Diagnoses and all orders for this visit:    1. History of obesity (Primary)    2. Overweight (BMI 25.0-29.9)  Assessment & Plan:  Patient's (Body mass index is 29.66 kg/m².) indicates that they are overweight with health conditions that include obstructive sleep apnea, dyslipidemias, GERD and osteoarthritis . Weight is improving with treatment. BMI is is above average; BMI management plan is completed. We discussed low calorie, low carb based diet program, portion control and increasing exercise.   --Sample of Saxenda provided in office. Advised to restart at 0.6mg due to break coverage and titrate up as directed  --Continue great efforts on calorie counts  --Let office know if she is unable to  Saxenda due to insurance change.  States that the pharmacy has a prescription currently and does not need a refill on this today.  --She has dropped below 30 BMI at this office visit and diagnosis of obesity changed to overweight.        I spent 30 minutes on this date of service. This time includes time spent by me in the following activities:preparing for the visit, counseling and educating the patient/family/caregiver, ordering medications, tests, or procedures and documenting information in the medical record.    Plan of care reviewed with the patient at the conclusion of today's visit. We discussed the risks, benefits, and limitations of treatments. Continue medications and OTC supplements as discussed. Patient verbalizes understanding of and agreement with management plan.      Return in about 4 weeks (around 1/26/2022).      JANETTE Stone

## 2021-12-29 ENCOUNTER — OFFICE VISIT (OUTPATIENT)
Dept: BARIATRICS/WEIGHT MGMT | Facility: CLINIC | Age: 61
End: 2021-12-29

## 2021-12-29 VITALS
WEIGHT: 157 LBS | DIASTOLIC BLOOD PRESSURE: 70 MMHG | SYSTOLIC BLOOD PRESSURE: 118 MMHG | HEIGHT: 61 IN | BODY MASS INDEX: 29.64 KG/M2 | HEART RATE: 90 BPM

## 2021-12-29 DIAGNOSIS — E66.3 OVERWEIGHT (BMI 25.0-29.9): ICD-10-CM

## 2021-12-29 DIAGNOSIS — Z86.39 HISTORY OF OBESITY: Primary | ICD-10-CM

## 2021-12-29 PROCEDURE — 99214 OFFICE O/P EST MOD 30 MIN: CPT | Performed by: NURSE PRACTITIONER

## 2021-12-29 NOTE — ASSESSMENT & PLAN NOTE
Patient's (Body mass index is 29.66 kg/m².) indicates that they are overweight with health conditions that include obstructive sleep apnea, dyslipidemias, GERD and osteoarthritis . Weight is improving with treatment. BMI is is above average; BMI management plan is completed. We discussed low calorie, low carb based diet program, portion control and increasing exercise.   --Sample of Saxenda provided in office. Advised to restart at 0.6mg due to break coverage and titrate up as directed  --Continue great efforts on calorie counts  --Let office know if she is unable to  Saxenda due to insurance change.  States that the pharmacy has a prescription currently and does not need a refill on this today.  --She has dropped below 30 BMI at this office visit and diagnosis of obesity changed to overweight.

## 2022-02-02 ENCOUNTER — TELEMEDICINE (OUTPATIENT)
Dept: BARIATRICS/WEIGHT MGMT | Facility: CLINIC | Age: 62
End: 2022-02-02

## 2022-02-02 VITALS
BODY MASS INDEX: 28.77 KG/M2 | HEIGHT: 61 IN | HEART RATE: 93 BPM | DIASTOLIC BLOOD PRESSURE: 77 MMHG | SYSTOLIC BLOOD PRESSURE: 100 MMHG | WEIGHT: 152.4 LBS

## 2022-02-02 DIAGNOSIS — E66.3 OVERWEIGHT (BMI 25.0-29.9): Primary | ICD-10-CM

## 2022-02-02 PROCEDURE — 99213 OFFICE O/P EST LOW 20 MIN: CPT | Performed by: NURSE PRACTITIONER

## 2022-02-02 NOTE — PROGRESS NOTES
"[facility] [place] [facility]   [address]  [place] [address]  [phone]    Name: Filomena Nixon  : 1960  Patient Care Team:  Kaushal Ledbetter MD as PCP - General (Family Medicine)    Chief Complaint;:   Chief Complaint   Patient presents with   • Follow-up   • Weight Loss        Patient presents during the COVID-19 pandemic/federally declared state of public health emergency.   This service was conducted via Zoom. Patient is being seen via telehealth service due to current public health emergency.     Virtual visit for Filomena Nixon, a 61 y.o. female, established patient for medical weight loss.       Patient is satisfied with weight loss progress  Hunger control has improved The patient is exercising. The patient is using a food journal. The patient is checking weight regularly. The patient rates current efforts as 8.75 out of 10. Body mass index is 28.8 kg/m². and has not reached treatment goal.     The patient is exercising with a FITT score of:    Frequency   Intensity Time Strength Training   []   0 None  []   0 None  []   0 None  []   0 None    []   1 (1-2x/week) []   1 (light) []   1 (<10 min) []   1 (1x/week)   []   2 (3-5x/week) [x]   2 (moderate) []   2 (10-20 min) []   2 (2x/week)   []   3 (daily)   []   3 (moderately hard)  []   4 (very hard) []   3 (20-30 min)  []   4 (>30 min) []   3 (3-4x/week)           Recent Weight History:   Wt Readings from Last 6 Encounters:   22 69.1 kg (152 lb 6.4 oz)   21 71.2 kg (157 lb)   21 73 kg (161 lb)   10/20/21 76.4 kg (168 lb 8 oz)   10/06/21 76.9 kg (169 lb 8 oz)   19 74.8 kg (165 lb)       Today's weight: 152.6  Change in weight since last visit: -  Start Weight: 169  Total Loss: -17lb      VS   Vitals:    22 0740   BP: 100/77   Pulse: 93   Weight: 69.1 kg (152 lb 6.4 oz)   Height: 154.9 cm (61\")         Physical Exam:    General:  .well developed; well nourished  no acute " distress  obese      ASSESSMENT/PLAN:   Diagnoses and all orders for this visit:    1. Overweight (BMI 25.0-29.9) (Primary)  Assessment & Plan:  Patient's (Body mass index is 28.8 kg/m².) indicates that they are overweight with health conditions that include obstructive sleep apnea, dyslipidemias, GERD and osteoarthritis . Weight is improving with treatment. BMI is is above average; BMI management plan is completed. We discussed low calorie, low carb based diet program, portion control and increasing exercise.   --Has lost 4.5 lb since last visit, continue great effort  --Pt exercise goal is to increase. Goal of min of 3 days week, min 30 day  --Continue Saxenda. Do not need refill as that was taken care of previously (90 day supply)   --Continue goal of 4-6 weight loss        Note: This was an audio and video enabled telemedicine encounter to comply with physical distancing guidelines during the COVID-19 pandemic.  Consent for televisit was obtained prior to the visit. Refills of controlled substances are being provided in this context because of the state of emergency and current social distancing guidance due to COVID-19. We will resume face-to-face visits as permitted and as advised by public health officials.     I spent 30 minutes on this date of service. This time includes time spent by me in the following activities:preparing for the visit, counseling and educating the patient/family/caregiver, ordering medications, tests, or procedures and documenting information in the medical record.    Plan of care reviewed with the patient at the conclusion of today's visit. We discussed the risks, benefits, and limitations of treatments. Continue medications and OTC supplements as discussed. Patient verbalizes understanding of and agreement with management plan.      Return in about 4 weeks (around 3/2/2022).      JANETTE Stone

## 2022-02-02 NOTE — ASSESSMENT & PLAN NOTE
Patient's (Body mass index is 28.8 kg/m².) indicates that they are overweight with health conditions that include obstructive sleep apnea, dyslipidemias, GERD and osteoarthritis . Weight is improving with treatment. BMI is is above average; BMI management plan is completed. We discussed low calorie, low carb based diet program, portion control and increasing exercise.   --Has lost 4.5 lb since last visit, continue great effort  --Pt exercise goal is to increase. Goal of min of 3 days week, min 30 day  --Continue Saxenda. Do not need refill as that was taken care of previously (90 day supply)   --Continue goal of 4-6 weight loss

## 2022-02-02 NOTE — PATIENT INSTRUCTIONS
Liraglutide injection (Weight Management)  What is this medicine?  LIRAGLUTIDE (LIR a GLOO tide) is used to help people lose weight and maintain weight loss. It is used with a reduced-calorie diet and exercise.  This medicine may be used for other purposes; ask your health care provider or pharmacist if you have questions.  COMMON BRAND NAME(S): Ronny  What should I tell my health care provider before I take this medicine?  They need to know if you have any of these conditions:  · endocrine tumors (MEN 2) or if someone in your family had these tumors  · gallbladder disease  · high cholesterol  · history of alcohol abuse problem  · history of pancreatitis  · kidney disease or if you are on dialysis  · liver disease  · previous swelling of the tongue, face, or lips with difficulty breathing, difficulty swallowing, hoarseness, or tightening of the throat  · stomach problems  · suicidal thoughts, plans, or attempt; a previous suicide attempt by you or a family member  · thyroid cancer or if someone in your family had thyroid cancer  · an unusual or allergic reaction to liraglutide, other medicines, foods, dyes, or preservatives  · pregnant or trying to get pregnant  · breast-feeding  How should I use this medicine?  This medicine is for injection under the skin of your upper leg, stomach area, or upper arm. You will be taught how to prepare and give this medicine. Use exactly as directed. Take your medicine at regular intervals. Do not take it more often than directed.  This medicine comes with INSTRUCTIONS FOR USE. Ask your pharmacist for directions on how to use this medicine. Read the information carefully. Talk to your pharmacist or health care provider if you have questions.  It is important that you put your used needles and syringes in a special sharps container. Do not put them in a trash can. If you do not have a sharps container, call your pharmacist or healthcare provider to get one.  A special MedGuide  will be given to you by the pharmacist with each prescription and refill. Be sure to read this information carefully each time.  Talk to your health care provider about the use of this medicine in children. While it may be prescribed for children as young as 12 years of age for selected conditions, precautions do apply.  Overdosage: If you think you have taken too much of this medicine contact a poison control center or emergency room at once.  NOTE: This medicine is only for you. Do not share this medicine with others.  What if I miss a dose?  If you miss a dose, take it as soon as you can. If it is almost time for your next dose, take only that dose. Do not take double or extra doses. If you miss your dose for 3 days or more, call your doctor or health care professional to talk about how to restart this medicine.  What may interact with this medicine?  · insulin and other medicines for diabetes  This list may not describe all possible interactions. Give your health care provider a list of all the medicines, herbs, non-prescription drugs, or dietary supplements you use. Also tell them if you smoke, drink alcohol, or use illegal drugs. Some items may interact with your medicine.  What should I watch for while using this medicine?  Visit your doctor or health care professional for regular checks on your progress.  Drink plenty of fluids while taking this medicine. Check with your doctor or health care professional if you get an attack of severe diarrhea, nausea, and vomiting. The loss of too much body fluid can make it dangerous for you to take this medicine.  This medicine may affect blood sugar levels. Ask your healthcare provider if changes in diet or medicines are needed if you have diabetes.  Patients and their families should watch out for worsening depression or thoughts of suicide. Also watch out for sudden changes in feelings such as feeling anxious, agitated, panicky, irritable, hostile, aggressive,  impulsive, severely restless, overly excited and hyperactive, or not being able to sleep. If this happens, especially at the beginning of treatment or after a change in dose, call your health care professional.  Women should inform their health care provider if they wish to become pregnant or think they might be pregnant. Losing weight while pregnant is not advised and may cause harm to the unborn child. Talk to your health care provider for more information.  What side effects may I notice from receiving this medicine?  Side effects that you should report to your doctor or health care professional as soon as possible:  · allergic reactions like skin rash, itching or hives, swelling of the face, lips, or tongue  · breathing problems  · diarrhea that continues or is severe  · lump or swelling on the neck  · severe nausea  · signs and symptoms of infection like fever or chills; cough; sore throat; pain or trouble passing urine  · signs and symptoms of low blood sugar such as feeling anxious; confusion; dizziness; increased hunger; unusually weak or tired; increased sweating; shakiness; cold, clammy skin; irritable; headache; blurred vision; fast heartbeat; loss of consciousness  · signs and symptoms of kidney injury like trouble passing urine or change in the amount of urine  · trouble swallowing  · unusual stomach upset or pain  · vomiting  Side effects that usually do not require medical attention (report to your doctor or health care professional if they continue or are bothersome):  · constipation  · decreased appetite  · diarrhea  · fatigue  · headache  · nausea  · pain, redness, or irritation at site where injected  · stomach upset  · stuffy or runny nose  This list may not describe all possible side effects. Call your doctor for medical advice about side effects. You may report side effects to FDA at 0-680-FDA-0541.  Where should I keep my medicine?  Keep out of the reach of children.  Store unopened pen in a  refrigerator between 2 and 8 degrees C (36 and 46 degrees F). Do not freeze or use if the medicine has been frozen. Protect from light and excessive heat. After you first use the pen, it can be stored at room temperature between 15 and 30 degrees C (59 and 86 degrees F) or in a refrigerator. Throw away your used pen after 30 days or after the expiration date, whichever comes first.  Do not store your pen with the needle attached. If the needle is left on, medicine may leak from the pen.  NOTE: This sheet is a summary. It may not cover all possible information. If you have questions about this medicine, talk to your doctor, pharmacist, or health care provider.  © 2021 [employer]/Gold Standard (2020-12-10 13:56:25)

## 2022-03-01 NOTE — PROGRESS NOTES
"[facility]   [address]  [place] [address]  [phone]    Name: Filomena Nixon  : 1960  Patient Care Team:  Kaushal Ledbetter MD as PCP - General (Family Medicine)    Chief Complaint;:   Chief Complaint   Patient presents with   • Weight Loss   • Follow-up        Patient presents during the COVID-19 pandemic/federally declared state of public health emergency.   This service was conducted via Zoom. Patient is being seen via telehealth service due to current public health emergency.     Virtual visit for Filomena Nixon, a 61 y.o. female, established patient for medical weight loss.     Patient is satisfied with weight loss progress  Hunger control has improved The patient is not exercising. The patient is using a food journal. The patient is checking weight regularly.  Body mass index is 28.3 kg/m². and has not reached treatment goal.     Recent Weight History:   Wt Readings from Last 6 Encounters:   22 67.9 kg (149 lb 12.8 oz)   22 69.1 kg (152 lb 6.4 oz)   21 71.2 kg (157 lb)   21 73 kg (161 lb)   10/20/21 76.4 kg (168 lb 8 oz)   10/06/21 76.9 kg (169 lb 8 oz)       Today's weight: 149.8  Change in weight since last visit: -2.8  Start Weight: 169  Total Loss: 19.2  %Loss of BBW:11.24    VS   Vitals:    22 0736   BP: 92/65   Weight: 67.9 kg (149 lb 12.8 oz)   Height: 154.9 cm (61\")       Physical Exam:    General:  .well developed; well nourished  no acute distress  obese      ASSESSMENT/PLAN:   Diagnoses and all orders for this visit:    1. Overweight (BMI 25.0-29.9) (Primary)  Assessment & Plan:  Patient's (Body mass index is 28.3 kg/m².) indicates that they are overweight with health conditions that include obstructive sleep apnea, dyslipidemias, GERD and osteoarthritis . Weight is improving with treatment. BMI is is above average; BMI management plan is completed. We discussed low calorie, low carb based diet " program, portion control and increasing exercise.   --This is a follow-up visit and patient is down around 3 pounds since last office visit.  Currently taking Saxenda with no adverse effects  --At last office visit we had set a goal of a minimum of 3 days a week 30 minutes exercise.  We will continue this goal this month to try to make this a habit.  --Has been staying around the 1000        Other orders  -     Liraglutide (SAXENDA) 18 MG/3ML injection pen; Inject 3 mg under the skin into the appropriate area as directed Daily for 90 days. Titrate up an instructed starting at 0.6mg daily  Dispense: 15 pen; Refill: 0  -     Insulin Pen Needle (Pen Needles) 32G X 4 MM misc; 1 each As Needed (with medication) for up to 100 days.  Dispense: 100 each; Refill: 0      Note: This was an audio and video enabled telemedicine encounter to comply with physical distancing guidelines during the COVID-19 pandemic.  Consent for televisit was obtained prior to the visit. Refills of controlled substances are being provided in this context because of the state of emergency and current social distancing guidance due to COVID-19. We will resume face-to-face visits as permitted and as advised by public health officials.     I spent 30 minutes on this date of service. This time includes time spent by me in the following activities:preparing for the visit, counseling and educating the patient/family/caregiver, ordering medications, tests, or procedures and documenting information in the medical record.    Plan of care reviewed with the patient at the conclusion of today's visit. We discussed the risks, benefits, and limitations of treatments. Continue medications and OTC supplements as discussed. Patient verbalizes understanding of and agreement with management plan.      Return in about 4 weeks (around 3/30/2022).      JANETTE Stone

## 2022-03-02 ENCOUNTER — TELEMEDICINE (OUTPATIENT)
Dept: BARIATRICS/WEIGHT MGMT | Facility: CLINIC | Age: 62
End: 2022-03-02

## 2022-03-02 VITALS
BODY MASS INDEX: 28.28 KG/M2 | WEIGHT: 149.8 LBS | SYSTOLIC BLOOD PRESSURE: 92 MMHG | HEIGHT: 61 IN | DIASTOLIC BLOOD PRESSURE: 65 MMHG

## 2022-03-02 DIAGNOSIS — E66.3 OVERWEIGHT (BMI 25.0-29.9): Primary | ICD-10-CM

## 2022-03-02 PROCEDURE — 99213 OFFICE O/P EST LOW 20 MIN: CPT | Performed by: NURSE PRACTITIONER

## 2022-03-02 RX ORDER — PEN NEEDLE, DIABETIC 30 GX3/16"
1 NEEDLE, DISPOSABLE MISCELLANEOUS AS NEEDED
Qty: 100 EACH | Refills: 0 | Status: SHIPPED | OUTPATIENT
Start: 2022-03-02 | End: 2022-05-15 | Stop reason: SDUPTHER

## 2022-03-02 NOTE — ASSESSMENT & PLAN NOTE
Patient's (Body mass index is 28.3 kg/m².) indicates that they are overweight with health conditions that include obstructive sleep apnea, dyslipidemias, GERD and osteoarthritis . Weight is improving with treatment. BMI is is above average; BMI management plan is completed. We discussed low calorie, low carb based diet program, portion control and increasing exercise.   --This is a follow-up visit and patient is down around 3 pounds since last office visit.  Currently taking Saxenda with no adverse effects  --At last office visit we had set a goal of a minimum of 3 days a week 30 minutes exercise.  We will continue this goal this month to try to make this a habit.  --Has been staying around the 1000

## 2022-04-04 NOTE — PROGRESS NOTES
"[facility]  [address]  [place] [address]    Name: Filomena Nixon  : 1960    /64   Pulse 87   Ht 154.9 cm (61\")   Wt 68.3 kg (150 lb 8 oz)   BMI 28.44 kg/m²     Chief Complaint   Patient presents with   • Obesity   • Follow-up        Allergies   Allergen Reactions   • Codeine Hives and Itching       Vitals:    22 0742   BP: 112/64   Pulse: 87   Weight: 68.3 kg (150 lb 8 oz)   Height: 154.9 cm (61\")       Recent Weight History:   Wt Readings from Last 6 Encounters:   22 68.3 kg (150 lb 8 oz)   22 67.9 kg (149 lb 12.8 oz)   22 69.1 kg (152 lb 6.4 oz)   21 71.2 kg (157 lb)   21 73 kg (161 lb)   10/20/21 76.4 kg (168 lb 8 oz)       Last office visit weight (at [facility] visit) : 157  Change in weight since last visit: -6.5  Start Weight:169  Total Loss%: -10.95  Loss of BBW: -18.5    Body composition analysis completed and showed:  %body fat:34.8  Total fat mass (in lbs):52.5  Fat free mass (in lbs): 98.0  Total body water (in lb): 71.5  BMR: 1316     Measurements (in inches)  Waist: 34    The patient is exercising with a FITT score of:    Frequency   Intensity Time Strength Training   [x]   0 None  []   0 None  []   0 None  []   0 None    []   1 (1-2x/week) [x]   1 (light) [x]   1 (<10 min) []   1 (1x/week)   []   2 (3-5x/week) []   2 (moderate) []   2 (10-20 min) []   2 (2x/week)   []   3 (daily)   []   3 (moderately hard)  []   4 (very hard) []   3 (20-30 min)  []   4 (>30 min) []   3 (3-4x/week)       Office visit for Filomena Nixon, a 61 y.o. female, established patient for medical weight management. Patient is satisfied with weight loss progress.    Hunger control has remained unchanged The patient is not exercising. The patient is using a food journal. The patient is checking weight regularly. The patient rates current efforts as 8 out of 10. Body mass index is 28.44 kg/m². and has not reached treatment " goal.     Review of Systems:  Review of Systems   Constitutional: Negative for fatigue.   Eyes: Negative for visual disturbance.   Cardiovascular: Negative for chest pain and palpitations.   Gastrointestinal: Negative for abdominal pain, constipation, diarrhea, nausea and GERD.   Neurological: Negative for dizziness, tremors, light-headedness, headache and memory problem.        Parasthesias negative   Psychiatric/Behavioral: Negative for sleep disturbance and depressed mood. The patient is not nervous/anxious.        Physical Exam:  Physical Exam  Vitals reviewed.   Constitutional:       Appearance: She is well-developed.      Comments: overweight   HENT:      Head:      Comments: masked  Pulmonary:      Effort: Pulmonary effort is normal.   Neurological:      Mental Status: She is alert.   Psychiatric:         Attention and Perception: Attention normal.         Mood and Affect: Mood normal.         Speech: Speech normal.         Behavior: Behavior normal.          ASSESSMENT/PLAN:   Diagnoses and all orders for this visit:    1. Overweight (BMI 25.0-29.9) (Primary)  Assessment & Plan:  Patient's (Body mass index is 28.44 kg/m².) indicates that they are overweight with health conditions that include dyslipidemias and GERD . Weight is improving with treatment. BMI is is above average; BMI management plan is completed. We discussed low calorie, low carb based diet program, portion control and increasing exercise.     --This is an in person follow up visit. She is down 7 lbs since last office visit. She has had 2 virtual visits  --Exercise has not become a habit. Goal of 2-3 time a week.   --Still in the habit of food journaling. Continue this great effort.   --Per her body comp, she is within a healthy fat range for her age/sex. Keep up the great effort.  --On review of food journal. She is low on protein. Focus on reaching the protein goal.     Orders:  -     Discontinue: Liraglutide (SAXENDA) 18 MG/3ML injection  pen; Inject 3 mg under the skin into the appropriate area as directed Daily for 90 days. Titrate up an instructed starting at 0.6mg daily  Dispense: 15 pen; Refill: 0  -     Liraglutide (SAXENDA) 18 MG/3ML injection pen; Inject 3 mg under the skin into the appropriate area as directed Daily for 90 days. Titrate up an instructed starting at 0.6mg daily  Dispense: 15 pen; Refill: 0     I spent 30 minutes on this date of service. This time includes time spent by me in the following activities:preparing for the visit, counseling and educating the patient/family/caregiver, ordering medications, tests, or procedures and documenting information in the medical record.    Plan of care reviewed with the patient at the conclusion of today's visit. We discussed the risks, benefits, and limitations of treatments. Continue medications and OTC supplements as discussed. Patient verbalizes understanding of and agreement with management plan.      Return in about 4 weeks (around 5/4/2022).      JANETTE Stone

## 2022-04-06 ENCOUNTER — OFFICE VISIT (OUTPATIENT)
Dept: BARIATRICS/WEIGHT MGMT | Facility: CLINIC | Age: 62
End: 2022-04-06

## 2022-04-06 VITALS
HEART RATE: 87 BPM | WEIGHT: 150.5 LBS | DIASTOLIC BLOOD PRESSURE: 64 MMHG | BODY MASS INDEX: 28.42 KG/M2 | HEIGHT: 61 IN | SYSTOLIC BLOOD PRESSURE: 112 MMHG

## 2022-04-06 DIAGNOSIS — E66.3 OVERWEIGHT (BMI 25.0-29.9): Primary | ICD-10-CM

## 2022-04-06 PROCEDURE — 99214 OFFICE O/P EST MOD 30 MIN: CPT | Performed by: NURSE PRACTITIONER

## 2022-04-06 NOTE — ASSESSMENT & PLAN NOTE
Patient's (Body mass index is 28.44 kg/m².) indicates that they are overweight with health conditions that include dyslipidemias and GERD . Weight is improving with treatment. BMI is is above average; BMI management plan is completed. We discussed low calorie, low carb based diet program, portion control and increasing exercise.     --This is an in person follow up visit. She is down 7 lbs since last office visit. She has had 2 virtual visits  --Exercise has not become a habit. Goal of 2-3 time a week.   --Still in the habit of food journaling. Continue this great effort.   --Per her body comp, she is within a healthy fat range for her age/sex. Keep up the great effort.  --On review of food journal. She is low on protein. Focus on reaching the protein goal.

## 2022-04-13 ENCOUNTER — OFFICE VISIT (OUTPATIENT)
Dept: ORTHOPEDIC SURGERY | Facility: CLINIC | Age: 62
End: 2022-04-13

## 2022-04-13 VITALS
BODY MASS INDEX: 28.43 KG/M2 | SYSTOLIC BLOOD PRESSURE: 122 MMHG | DIASTOLIC BLOOD PRESSURE: 80 MMHG | HEIGHT: 61 IN | WEIGHT: 150.57 LBS

## 2022-04-13 DIAGNOSIS — M77.42 METATARSALGIA OF LEFT FOOT: ICD-10-CM

## 2022-04-13 DIAGNOSIS — M21.6X2 ACQUIRED METATARSUS VARUS OF LEFT FOOT: Primary | ICD-10-CM

## 2022-04-13 PROCEDURE — 99213 OFFICE O/P EST LOW 20 MIN: CPT | Performed by: ORTHOPAEDIC SURGERY

## 2022-04-13 NOTE — PROGRESS NOTES
ESTABLISHED PATIENT    Patient: Filomena Nixon  : 1960    Primary Care Provider: Kaushal Ledbetter MD    Requesting Provider: As above    Follow-up (1.5 year f/u--Acquired metatarsus varus of left foot )      History    Chief Complaint: left foot pain    History of Present Illness: This is an extremely pleasant 61-year-old woman who I saw about a year and a half ago with left foot synovitis of the second and third MTPJ's with early hammertoe.  We treated her conservatively and the pain resolved.  She returns reporting that the symptoms have recurred over the past several months.  No new deformity.  No new injury.  Hurts more barefoot, feels better with padding.  The pain is concentrated under the second metatarsal head she has been using her Budin splint again, she has 1 loop splint and she is putting it on the third toe.    Current Outpatient Medications on File Prior to Visit   Medication Sig Dispense Refill   • aspirin 81 MG tablet aspirin 81 mg tablet   Daily     • atorvastatin (LIPITOR) 10 MG tablet Take 10 mg by mouth every night at bedtime.     • Cholecalciferol (Vitamin D3) 1.25 MG (90944 UT) capsule Take 1 tablet by mouth Daily.     • estradiol (MINIVELLE, VIVELLE-DOT) 0.05 MG/24HR patch APPLY PATCH TWICE WEEKLY  2   • etodolac (LODINE) 500 MG tablet Take 500 mg by mouth 2 (two) times a day.     • Insulin Pen Needle (Pen Needles) 32G X 4 MM misc 1 each As Needed (with medication) for up to 100 days. 100 each 0   • Liraglutide (SAXENDA) 18 MG/3ML injection pen Inject 3 mg under the skin into the appropriate area as directed Daily for 90 days. Titrate up an instructed starting at 0.6mg daily 15 pen 0   • MULTIPLE VITAMIN PO Multiple Vitamin capsule   Daily     • Omega-3 Fatty Acids (FISH OIL) 1000 MG capsule capsule Take  by mouth Daily With Breakfast.     • OMEPRAZOLE PO omeprazole 20 mg capsule,delayed release   one po daily     • Psyllium (GENFIBER PO) Genfiber (psyllium-dextrose) oral powder   As  "Directed     • sertraline (ZOLOFT) 50 MG tablet Take 50 mg by mouth every night at bedtime.     • TURMERIC PO Take  by mouth.       No current facility-administered medications on file prior to visit.      Allergies   Allergen Reactions   • Codeine Hives and Itching      Past Medical History:   Diagnosis Date   • CTS (carpal tunnel syndrome) 2019    Both wrists repaired   • Heart burn    • Hyperlipemia    • Knee swelling 2012   • Obstructive sleep apnea    • Osteoarthritis    • PCOS (polycystic ovarian syndrome)    • Prediabetes    • Tear of meniscus of knee     Right knee Repaired 2021     Past Surgical History:   Procedure Laterality Date   • CARPAL TUNNEL RELEASE     • HAND SURGERY     • HYSTERECTOMY     • KNEE SURGERY Left     arthroscopy     Family History   Problem Relation Age of Onset   • Osteoarthritis Mother    • Diabetes Mother    • Hypertension Father       Social History     Socioeconomic History   • Marital status:    Tobacco Use   • Smoking status: Never Smoker   • Smokeless tobacco: Never Used   Substance and Sexual Activity   • Alcohol use: Yes     Alcohol/week: 4.0 standard drinks     Types: 4 Glasses of wine per week   • Drug use: No   • Sexual activity: Defer        Review of Systems   Constitutional: Negative.    HENT: Negative.    Eyes: Negative.    Respiratory: Negative.    Cardiovascular: Negative.    Gastrointestinal: Negative.    Endocrine: Negative.    Genitourinary: Negative.    Musculoskeletal: Positive for arthralgias.   Skin: Negative.    Allergic/Immunologic: Negative.    Neurological: Negative.    Hematological: Negative.    Psychiatric/Behavioral: Negative.        The following portions of the patient's history were reviewed and updated as appropriate: allergies, current medications, past family history, past medical history, past social history, past surgical history and problem list.    Physical Exam:   /80   Ht 154.9 cm (60.98\")   Wt 68.3 kg (150 lb 9.2 oz)   BMI " 28.47 kg/m²   GENERAL: Body habitus: normal weight for height    Lower extremity edema: Left: none; Right: none    Gait: normal     Mental Status:  awake and alert; oriented to person, place, and time  MSK:  Tibia:  Right:  non tender; Left:  non tender        Ankle:  Right: non tender; Left:  non tender        Foot:  Right:  non tender; Left:  Tender under the second metatarsal head with slight swelling in the second MTPJ, mildly tender under the third, no significant deformity except for slight flexible resting posture of mild hammertoe, no instability    NEURO Sensation:  intact    Medical Decision Making    Data Review:   ordered and reviewed x-rays today    Assessment/Plan/Diagnosis/Treatment Options:   1. Metatarsalgia of left foot  Recurrence of synovitis left second MTPJ.  I explained to her that it is unusual for this to come back.  There is no stiff deformity, the joint is stable, I would recommend using a 2 loop  Budin splint on both the second and third toes.  She is already on anti-inflammatory.  I also recommend she go back to wearing her orthotics more of the time.  We talked about injection but I would like to hold off on that because of the possible risk of dislocation.  She is going to use the splint I will see her in 3 months standing 2 views of the foot      Lor Castellanos MD

## 2022-05-15 NOTE — PROGRESS NOTES
"[facility]  [address]  [place] [address]    Name: Filomena Nixon  : 1960    /64 (BP Location: Right arm, Patient Position: Sitting, Cuff Size: Adult)   Pulse 79   Temp 97.8 °F (36.6 °C) (Temporal)   Resp 18   Ht 154.9 cm (60.98\")   Wt 66.9 kg (147 lb 8 oz)   SpO2 98%   BMI 27.89 kg/m²     Chief Complaint   Patient presents with   • Obesity   • Follow-up        Allergies   Allergen Reactions   • Codeine Hives and Itching       Vitals:    22 1345   BP: 112/64   BP Location: Right arm   Patient Position: Sitting   Cuff Size: Adult   Pulse: 79   Resp: 18   Temp: 97.8 °F (36.6 °C)   TempSrc: Temporal   SpO2: 98%   Weight: 66.9 kg (147 lb 8 oz)   Height: 154.9 cm (60.98\")     Recent Weight History:   Wt Readings from Last 6 Encounters:   22 66.9 kg (147 lb 8 oz)   22 68.3 kg (150 lb 9.2 oz)   22 68.3 kg (150 lb 8 oz)   22 67.9 kg (149 lb 12.8 oz)   22 69.1 kg (152 lb 6.4 oz)   21 71.2 kg (157 lb)       Last office visit weight (at [facility] visit) : 150.5  Change in weight since last visit: -3  Start Weight: 169  Total Loss%: -12.72  Loss of BBW: -21.5    Body composition analysis completed and showed:  %body fat: 34.6  Total fat mass (in lbs): 51.0  Fat free mass (in lbs): 96.5  Total body water (in lb): 70.5  BMR: 1299     Measurements (in inches)  Waist: 34    The patient is exercising with a FITT score of:    Frequency   Intensity Time Strength Training   []   0 None  []   0 None  []   0 None  [x]   0 None    []   1 (1-2x/week) [x]   1 (light) [x]   1 (<10 min) []   1 (1x/week)   []   2 (3-5x/week) []   2 (moderate) []   2 (10-20 min) []   2 (2x/week)   [x]   3 (daily)   []   3 (moderately hard)  []   4 (very hard) []   3 (20-30 min)  []   4 (>30 min) []   3 (3-4x/week)       Office visit for Filomena Fossper, a 62 y.o. female, established patient for medical weight management. Patient is satisfied with " weight loss progress.    Hunger control has remained unchanged The patient is exercising. The patient is using a food journal. The patient is checking weight regularly. The patient rates current efforts as 8.5 out of 10. Body mass index is 27.89 kg/m². and has not reached treatment goal.     Review of Systems:  Review of Systems   Constitutional: Negative for fatigue.   Eyes: Negative for visual disturbance.   Cardiovascular: Negative for chest pain and palpitations.   Gastrointestinal: Negative for abdominal pain, constipation, diarrhea, nausea and GERD.   Neurological: Negative for dizziness, tremors, light-headedness, headache and memory problem.        Parasthesias negative   Psychiatric/Behavioral: Positive for sleep disturbance. Negative for depressed mood. The patient is not nervous/anxious.        Physical Exam:  Physical Exam  Vitals reviewed.   Constitutional:       Appearance: She is well-developed.      Comments: overweight   HENT:      Head:      Comments: masked  Pulmonary:      Effort: Pulmonary effort is normal.   Neurological:      Mental Status: She is alert.   Psychiatric:         Attention and Perception: Attention normal.         Mood and Affect: Mood normal.         Speech: Speech normal.         Behavior: Behavior normal.          ASSESSMENT/PLAN:   Diagnoses and all orders for this visit:    1. Overweight (BMI 25.0-29.9) (Primary)  Assessment & Plan:  Patient's (Body mass index is 27.89 kg/m².) indicates that they are overweight with health conditions that include obstructive sleep apnea, dyslipidemias, GERD and osteoarthritis . Weight is newly identified. BMI is is above average; BMI management plan is completed. We discussed low calorie, low carb based diet program, portion control and increasing exercise.     --This is a follow up visit and she is down 3 lbs since last office visit.   --She is currently tracking foods.  We will do a food journal review at next office visit.  States in  general she is staying within her calorie goals.  Goal this next month is to track at least 25 out of 30 full days.       Orders:  -     Liraglutide (SAXENDA) 18 MG/3ML injection pen; Inject 3 mg under the skin into the appropriate area as directed Daily for 90 days. Titrate up an instructed starting at 0.6mg daily  Dispense: 15 pen; Refill: 1  -     Insulin Pen Needle (Pen Needles) 32G X 4 MM misc; 1 each As Needed (with medication) for up to 100 days.  Dispense: 100 each; Refill: 0       I spent 30 minutes on this date of service. This time includes time spent by me in the following activities:preparing for the visit, counseling and educating the patient/family/caregiver, ordering medications, tests, or procedures and documenting information in the medical record.    Plan of care reviewed with the patient at the conclusion of today's visit. We discussed the risks, benefits, and limitations of treatments. Continue medications and OTC supplements as discussed. Patient verbalizes understanding of and agreement with management plan.      Return in about 4 weeks (around 6/13/2022).      JANETTE Stone

## 2022-05-16 ENCOUNTER — OFFICE VISIT (OUTPATIENT)
Dept: BARIATRICS/WEIGHT MGMT | Facility: CLINIC | Age: 62
End: 2022-05-16

## 2022-05-16 VITALS
BODY MASS INDEX: 27.85 KG/M2 | OXYGEN SATURATION: 98 % | RESPIRATION RATE: 18 BRPM | TEMPERATURE: 97.8 F | SYSTOLIC BLOOD PRESSURE: 112 MMHG | HEIGHT: 61 IN | DIASTOLIC BLOOD PRESSURE: 64 MMHG | WEIGHT: 147.5 LBS | HEART RATE: 79 BPM

## 2022-05-16 DIAGNOSIS — E66.3 OVERWEIGHT (BMI 25.0-29.9): Primary | ICD-10-CM

## 2022-05-16 PROCEDURE — 99214 OFFICE O/P EST MOD 30 MIN: CPT | Performed by: NURSE PRACTITIONER

## 2022-05-16 RX ORDER — PEN NEEDLE, DIABETIC 30 GX3/16"
1 NEEDLE, DISPOSABLE MISCELLANEOUS AS NEEDED
Qty: 100 EACH | Refills: 0 | Status: SHIPPED | OUTPATIENT
Start: 2022-05-16 | End: 2022-08-10 | Stop reason: SDUPTHER

## 2022-05-16 NOTE — ASSESSMENT & PLAN NOTE
Patient's (Body mass index is 27.89 kg/m².) indicates that they are overweight with health conditions that include obstructive sleep apnea, dyslipidemias, GERD and osteoarthritis . Weight is newly identified. BMI is is above average; BMI management plan is completed. We discussed low calorie, low carb based diet program, portion control and increasing exercise.     --This is a follow up visit and she is down 3 lbs since last office visit.   --She is currently tracking foods.  We will do a food journal review at next office visit.  States in general she is staying within her calorie goals.  Goal this next month is to track at least 25 out of 30 full days.

## 2022-06-07 ENCOUNTER — TELEPHONE (OUTPATIENT)
Dept: ORTHOPEDIC SURGERY | Facility: CLINIC | Age: 62
End: 2022-06-07

## 2022-06-07 ENCOUNTER — PATIENT MESSAGE (OUTPATIENT)
Dept: ORTHOPEDIC SURGERY | Facility: CLINIC | Age: 62
End: 2022-06-07

## 2022-06-07 NOTE — TELEPHONE ENCOUNTER
Sent patient back a message in [employer] letting her know what Dr. Castellanos said.  Nicci

## 2022-06-07 NOTE — TELEPHONE ENCOUNTER
----- Message from Filomena Tiffani sent at 6/7/2022 10:20 AM EDT -----  Regarding: Follow-up visit  Good morning.  Just wanted to let you know that my toes are better - not resolved but I'm still wearing the splint when they start bothering me.  Since there's really nothing more we can do about my old lady toes, I'm canceling my follow-up appointment for August.

## 2022-07-14 ENCOUNTER — TELEMEDICINE (OUTPATIENT)
Dept: BARIATRICS/WEIGHT MGMT | Facility: CLINIC | Age: 62
End: 2022-07-14

## 2022-07-14 VITALS — HEIGHT: 61 IN | WEIGHT: 142.3 LBS | BODY MASS INDEX: 26.87 KG/M2

## 2022-07-14 DIAGNOSIS — E66.3 OVERWEIGHT (BMI 25.0-29.9): Primary | ICD-10-CM

## 2022-07-14 PROCEDURE — 99214 OFFICE O/P EST MOD 30 MIN: CPT | Performed by: NURSE PRACTITIONER

## 2022-07-14 NOTE — PROGRESS NOTES
"[facility]   [address]  [place] [address]  [phone]    Name: Filomena Nixon  : 1960  Patient Care Team:  Kaushal Ledbetter MD as PCP - General (Family Medicine)    Chief Complaint;:   Chief Complaint   Patient presents with   • Follow-up        Patient presents during the COVID-19 pandemic/federally declared state of public health emergency.   This service was conducted via Zoom. Patient is being seen via telehealth service due to current public health emergency.     Virtual visit for Filomena Nixon, a 62 y.o. female, established patient for medical weight loss.     Patient is satisfied with weight loss progress  Hunger control has improved The patient is exercising. The patient is using a food journal. The patient is checking weight regularly.  Body mass index is 26.89 kg/m².     Recent Weight History:   Wt Readings from Last 6 Encounters:   22 64.5 kg (142 lb 4.8 oz)   22 66.9 kg (147 lb 8 oz)   22 68.3 kg (150 lb 9.2 oz)   22 68.3 kg (150 lb 8 oz)   22 67.9 kg (149 lb 12.8 oz)   22 69.1 kg (152 lb 6.4 oz)     Last Visits weight: 147  Today's weight: 142  Change in weight since last visit: -5  Start Weight: 169  Total Loss: -22     VS   Vitals:    22 0729   Weight: 64.5 kg (142 lb 4.8 oz)   Height: 154.9 cm (61\")     Physical Exam:    General:  .well developed; well nourished  no acute distress  obese      ASSESSMENT/PLAN:   Diagnoses and all orders for this visit:    1. Overweight (BMI 25.0-29.9) (Primary)  Assessment & Plan:  Patient's (Body mass index is 26.89 kg/m².) indicates that they are overweight with health conditions that include obstructive sleep apnea, dyslipidemias, GERD and osteoarthritis . Weight is improving with treatment. BMI is is above average; BMI management plan is completed. We discussed low calorie, low carb based diet program, portion control and increasing exercise. "   --This is a follow up pt and she is down and she is down 5 lbs. Since last office visit.   --Doing well with Saxenda. Discussed prevention of constipation that is common with this class  --Reached second goal gaol of 145.  --    Orders:  -     Liraglutide (SAXENDA) 18 MG/3ML injection pen; Inject 3 mg under the skin into the appropriate area as directed Daily for 90 days. Titrate up an instructed starting at 0.6mg daily  Dispense: 15 pen; Refill: 1    Note: This was an audio and video enabled telemedicine encounter to comply with physical distancing guidelines during the COVID-19 pandemic.  Consent for televisit was obtained prior to the visit. Refills of controlled substances are being provided in this context because of the state of emergency and current social distancing guidance due to COVID-19. We will resume face-to-face visits as permitted and as advised by public health officials.     I spent 30 minutes on this date of service. This time includes time spent by me in the following activities:preparing for the visit, counseling and educating the patient/family/caregiver, ordering medications, tests, or procedures and documenting information in the medical record.    Plan of care reviewed with the patient at the conclusion of today's visit. We discussed the risks, benefits, and limitations of treatments. Continue medications and OTC supplements as discussed. Patient verbalizes understanding of and agreement with management plan.      Return in about 4 weeks (around 8/11/2022).      JANETTE Stone

## 2022-07-14 NOTE — ASSESSMENT & PLAN NOTE
Patient's (Body mass index is 26.89 kg/m².) indicates that they are overweight with health conditions that include obstructive sleep apnea, dyslipidemias, GERD and osteoarthritis . Weight is improving with treatment. BMI is is above average; BMI management plan is completed. We discussed low calorie, low carb based diet program, portion control and increasing exercise.   --This is a follow up pt and she is down and she is down 5 lbs. Since last office visit.   --Doing well with Saxenda. Discussed prevention of constipation that is common with this class  --Reached second goal gaol of 145.  --

## 2022-08-10 DIAGNOSIS — E66.3 OVERWEIGHT (BMI 25.0-29.9): ICD-10-CM

## 2022-08-11 RX ORDER — PEN NEEDLE, DIABETIC 30 GX3/16"
1 NEEDLE, DISPOSABLE MISCELLANEOUS AS NEEDED
Qty: 100 EACH | Refills: 0 | Status: SHIPPED | OUTPATIENT
Start: 2022-08-11 | End: 2022-09-12 | Stop reason: SDUPTHER

## 2022-08-13 NOTE — PROGRESS NOTES
"  [facility]  [address]  [place] [address]     Office Note      Date: 08/15/2022  Patient Name: Filomena Nixon  MRN: [id number]  : 1960  Subjective  Subjective     Chief Complaint  Obesity Management follow-up     {Problem List  Visit Diagnosis   Encounters  Notes  Medications  Labs  Result Review Imaging  Media :23}     Filomena Nixon presents to [facility] for obesity management.   Patient is {Satisfied/unsatisfied/unsure:34763} with weight loss progress. Appetite is {poor/mod/well:16785}. Reports no side effects of prescribed medications today. The patient {is / IS NOT:97206::\"is not\"} taking multivitamin and {is / IS NOT:17227::\"is not\"} taking fish oil.  The patient {is / IS NOT:10025::\"is not\"} using a food journal.  The patient rates current efforts as *** out of 10.  The patient is exercising with a FITT score of:    Frequency Intensity Time Strength Training   []   0, none []   0 []   0 []   0   []   1 (1-2x/week) []   1 (light) []   1 (<10 min) []   1 (1x/week)   []   2 (3-5x/week) []   2 (moderate) []   2 (10-20 min) []   2 (2x/week)   []   3 (daily) []   3 (moderately hard)  []   4 (very hard) []   3 (20-30 min)  []   4 (>30 min) []   3 (3-4x/week)     Review of Systems    Objective   Start weight: 169 pounds.    Total Loss lb/%Loss of beginning body weight (BBW): ***lb/***%  Change in weight since last visit: ***    Recent Weight History:   Wt Readings from Last 6 Encounters:   22 64.5 kg (142 lb 4.8 oz)   22 66.9 kg (147 lb 8 oz)   22 68.3 kg (150 lb 9.2 oz)   22 68.3 kg (150 lb 8 oz)   22 67.9 kg (149 lb 12.8 oz)   22 69.1 kg (152 lb 6.4 oz)       There is no height or weight on file to calculate BMI.   Body composition analysis completed and showed:   %body fat: ***  Measurements (in inches)  Waist: ***    Vital Signs:   There were no vitals taken for this visit.  "   Physical Exam   Result Review :{Labs  Result Review  Imaging  Med Tab  Media :23}   {The following data was reviewed by (Optional):22152}  {Ambulatory Labs (Optional):04977}           Assessment / Plan     {CC Problem List  Visit Diagnosis  ROS  Review (Popup)  Health Maintenance  Quality  BestPractice  Medications  SmartSets  SnapShot Encounters  Media :23}   There are no diagnoses linked to this encounter.      We discussed the risks, benefits, and limitations of treatments. Continue medications and OTC supplements as discussed. Patient verbalizes understanding of and agreement with management plan.     Follow Up {Instructions Charge Capture  Follow-up Communications :23}  No follow-ups on file.  Patient was given instructions and counseling regarding her condition or for health maintenance advice. Please see specific information pulled into the AVS if appropriate.     I spent 30 minutes on this date of service. This time includes time spent by me in the following activities:preparing for the visit, counseling and educating the patient/family/caregiver, ordering medications, tests, or procedures and documenting information in the medical record.    Yojana Nelson, APRN  08/15/2022

## 2022-08-15 ENCOUNTER — OFFICE VISIT (OUTPATIENT)
Dept: BARIATRICS/WEIGHT MGMT | Facility: CLINIC | Age: 62
End: 2022-08-15

## 2022-08-15 VITALS
WEIGHT: 142 LBS | HEIGHT: 62 IN | BODY MASS INDEX: 26.13 KG/M2 | OXYGEN SATURATION: 100 % | TEMPERATURE: 98 F | SYSTOLIC BLOOD PRESSURE: 134 MMHG | DIASTOLIC BLOOD PRESSURE: 70 MMHG | HEART RATE: 89 BPM

## 2022-08-15 DIAGNOSIS — E66.3 OVERWEIGHT (BMI 25.0-29.9): Primary | ICD-10-CM

## 2022-08-15 PROCEDURE — 99214 OFFICE O/P EST MOD 30 MIN: CPT | Performed by: NURSE PRACTITIONER

## 2022-08-15 RX ORDER — ESTRADIOL 0.03 MG/D
FILM, EXTENDED RELEASE TRANSDERMAL
COMMUNITY
Start: 2022-08-03 | End: 2022-08-15

## 2022-08-15 NOTE — ASSESSMENT & PLAN NOTE
Patient's (Body mass index is 25.97 kg/m².) indicates that they are overweight with health conditions that include obstructive sleep apnea, dyslipidemias, GERD and osteoarthritis . Weight is improving with treatment. BMI is is above average; BMI management plan is completed. We discussed low calorie, low carb based diet program, portion control and an dago-based approach such as Hortor Pal or Lose It.     --This is a follow-up visit and patient is down slightly less than a half a pound since last being seen around a month ago.  -- She currently is taking Saxenda and tolerating well.  -- We did review her body composition analysis today and she is doing great now within the healthiest fat range for someone her age and sex.  Readjusted her weight loss goal to 140 which is well within that range as well.  -- We did discuss Saxenda and her options when she reaches this goals.  Did have some concerns that she would have to stay on it forever and advised that this is not the case.  -- She is currently journaling around 25 out of 30 days.  We did do just a brief review of this journal today.  Just that her macro goals to protein 30%, carb 40%.  She is getting start analyzing these a little bit more in detail over the next month.  Calorie goal remains the same.  -- Continue Saxenda refill not required today.

## 2022-08-15 NOTE — PROGRESS NOTES
[facility]  [address]  [place] [address]     Office Note      Date: 08/15/2022  Patient Name: Filomena Nixon  MRN: [id number]  : 1960  Subjective  Subjective     Chief Complaint  Obesity Management follow-up          Filomena Nixon presents to [facility] for obesity management.   Patient is satisfied with weight loss progress. Appetite is well controlled. Reports no side effects of prescribed medications today. The patient is taking multivitamin and is taking fish oil.  The patient is using a food journal.  The patient rates current efforts as 7 out of 10.    The patient is exercising with a FITT score of:    Frequency Intensity Time Strength Training   []   0, none []   0 []   0 [x]   0   []   1 (1-2x/week) [x]   1 (light) []   1 (<10 min) []   1 (1x/week)   []   2 (3-5x/week) []   2 (moderate) [x]   2 (10-20 min) []   2 (2x/week)   [x]   3 (daily) []   3 (moderately hard)  []   4 (very hard) []   3 (20-30 min)  []   4 (>30 min) []   3 (3-4x/week)     Review of Systems   Constitutional: Negative for appetite change and fatigue.   Eyes: Negative for blurred vision and visual disturbance.   Cardiovascular: Negative for chest pain and palpitations.   Gastrointestinal: Negative for abdominal pain, constipation, diarrhea, nausea and GERD.   Endocrine: Negative for polydipsia, polyphagia and polyuria.   Musculoskeletal: Negative for arthralgias and myalgias.   Neurological: Negative for dizziness, tremors, light-headedness, headache and memory problem.        Parasthesias negative   Psychiatric/Behavioral: Negative for sleep disturbance and depressed mood. The patient is not nervous/anxious.        Objective   Start weight: 169 pounds.    Total Loss lb/%Loss of beginning body weight (BBW): -27lb/16%    Recent Weight History:   Wt Readings from Last 6 Encounters:   08/15/22 64.4 kg (142 lb)   22 64.5 kg (142 lb 4.8 oz)  "  05/16/22 66.9 kg (147 lb 8 oz)   04/13/22 68.3 kg (150 lb 9.2 oz)   04/06/22 68.3 kg (150 lb 8 oz)   03/02/22 67.9 kg (149 lb 12.8 oz)       Body mass index is 25.97 kg/m².   Body composition analysis completed and showed:   %body fat: 32.9  Measurements (in inches)  Waist: 32    Vital Signs:   /70   Pulse 89   Temp 98 °F (36.7 °C)   Ht 157.5 cm (62\")   Wt 64.4 kg (142 lb)   SpO2 100%   BMI 25.97 kg/m²     Physical Exam  Vitals reviewed.   Constitutional:       Appearance: She is well-developed.      Comments: overweight   HENT:      Head:      Comments: masked  Pulmonary:      Effort: Pulmonary effort is normal.   Neurological:      Mental Status: She is alert.   Psychiatric:         Attention and Perception: Attention normal.         Mood and Affect: Mood normal.         Speech: Speech normal.         Behavior: Behavior normal.        Result Review :                Assessment / Plan        Diagnoses and all orders for this visit:    1. Overweight (BMI 25.0-29.9) (Primary)  Assessment & Plan:  Patient's (Body mass index is 25.97 kg/m².) indicates that they are overweight with health conditions that include obstructive sleep apnea, dyslipidemias, GERD and osteoarthritis . Weight is improving with treatment. BMI is is above average; BMI management plan is completed. We discussed low calorie, low carb based diet program, portion control and an dago-based approach such as [place] Pal or Lose It.     --This is a follow-up visit and patient is down slightly less than a half a pound since last being seen around a month ago.  -- She currently is taking Saxenda and tolerating well.  -- We did review her body composition analysis today and she is doing great now within the healthiest fat range for someone her age and sex.  Readjusted her weight loss goal to 140 which is well within that range as well.  -- We did discuss Saxenda and her options when she reaches this goals.  Did have some concerns that she would " have to stay on it forever and advised that this is not the case.  -- She is currently journaling around 25 out of 30 days.  We did do just a brief review of this journal today.  Just that her macro goals to protein 30%, carb 40%.  She is getting start analyzing these a little bit more in detail over the next month.  Calorie goal remains the same.  -- Continue Saxenda refill not required today.      We discussed the risks, benefits, and limitations of treatments. Continue medications and OTC supplements as discussed. Patient verbalizes understanding of and agreement with management plan.     Follow Up   No follow-ups on file.  Patient was given instructions and counseling regarding her condition or for health maintenance advice. Please see specific information pulled into the AVS if appropriate.     I spent 30 minutes on this date of service. This time includes time spent by me in the following activities:preparing for the visit, counseling and educating the patient/family/caregiver, ordering medications, tests, or procedures and documenting information in the medical record.    Yojana Nelson, APRN  08/15/2022

## 2022-09-04 DIAGNOSIS — E66.3 OVERWEIGHT (BMI 25.0-29.9): ICD-10-CM

## 2022-09-11 NOTE — PROGRESS NOTES
[facility]  [address]  [place] [address]     Office Note      Date: 2022  Patient Name: Filomena Nixon  MRN: [id number]  : 1960  Subjective  Subjective     Chief Complaint  Obesity Management follow-up          Filomena Nixon presents to [facility] for obesity management.   Patient is Satisfied with weight loss progress. Appetite is moderate. Reports no side effects of prescribed medications today. The patient IS taking multivitamin and IS taking fish oil.  The patient IS using a food journal.  The patient rates current efforts as 8 out of 10.  The patient is exercising with a FITT score of:    Frequency Intensity Time Strength Training   []   0, none []   0 []   0 [x]   0   []   1 (1-2x/week) [x]   1 (light) []   1 (<10 min) []   1 (1x/week)   []   2 (3-5x/week) []   2 (moderate) [x]   2 (10-20 min) []   2 (2x/week)   [x]   3 (daily) []   3 (moderately hard)  []   4 (very hard) []   3 (20-30 min)  []   4 (>30 min) []   3 (3-4x/week)     Review of Systems   Constitutional: Negative for appetite change and fatigue.   Eyes: Negative for blurred vision and visual disturbance.   Cardiovascular: Negative for chest pain and palpitations.   Gastrointestinal: Negative for abdominal pain, constipation, diarrhea, nausea and GERD.   Endocrine: Negative for polydipsia, polyphagia and polyuria.   Musculoskeletal: Negative for arthralgias and myalgias.   Neurological: Negative for dizziness, tremors, light-headedness, headache and memory problem.        Parasthesias negative   Psychiatric/Behavioral: Negative for sleep disturbance and depressed mood. The patient is not nervous/anxious.        Objective   Start weight: 169 pounds.    Total Loss lb/%Loss of beginning body weight (BBW): 140.5lb/-16.86%  Change in weight since last visit: -1.5    Recent Weight History:   Wt Readings from Last 6 Encounters:   22 63.7 kg (140 lb 8 oz)  "  08/15/22 64.4 kg (142 lb)   07/14/22 64.5 kg (142 lb 4.8 oz)   05/16/22 66.9 kg (147 lb 8 oz)   04/13/22 68.3 kg (150 lb 9.2 oz)   04/06/22 68.3 kg (150 lb 8 oz)       Body mass index is 25.69 kg/m².   Body composition analysis completed and showed:   %body fat: 34.8  Measurements (in inches)  Waist: 32\"    Vital Signs:   Blood Pressure: 64/62  Pulse: 98  O2: 99  Weight: 140.5  Height: 62\"      Physical Exam   Result Review :              Assessment / Plan        Diagnoses and all orders for this visit:    1. Overweight (BMI 25.0-29.9) (Primary)  Assessment & Plan:  Patient's (Body mass index is 25.69 kg/m².) indicates that they are overweight with health conditions that include obstructive sleep apnea, dyslipidemias, GERD and osteoarthritis . Weight is improving with treatment. BMI is is above average; BMI management plan is completed. We discussed low calorie, low carb based diet program, portion control, increasing exercise and an dago-based approach such as [employer] Pal or Lose It.   --This is   --Knee replacement (L) this month. Be mind    Orders:  -     Liraglutide (SAXENDA) 18 MG/3ML injection pen; Inject 3 mg under the skin into the appropriate area as directed Daily for 90 days. Titrate up an instructed starting at 0.6mg daily  Dispense: 15 mL; Refill: 1  -     Insulin Pen Needle (Pen Needles) 32G X 4 MM misc; 1 each As Needed (with medication) for up to 100 days.  Dispense: 100 each; Refill: 0    We discussed the risks, benefits, and limitations of treatments. Continue medications and OTC supplements as discussed. Patient verbalizes understanding of and agreement with management plan.     Follow Up   No follow-ups on file.  Patient was given instructions and counseling regarding her condition or for health maintenance advice. Please see specific information pulled into the AVS if appropriate.     I spent 20 minutes on this date of service. This time includes time spent by me in the following " activities:preparing for the visit, counseling and educating the patient/family/caregiver, ordering medications, tests, or procedures and documenting information in the medical record.    Yojana Nelson, JANETTE  09/12/2022

## 2022-09-12 ENCOUNTER — OFFICE VISIT (OUTPATIENT)
Dept: BARIATRICS/WEIGHT MGMT | Facility: CLINIC | Age: 62
End: 2022-09-12

## 2022-09-12 VITALS
OXYGEN SATURATION: 99 % | SYSTOLIC BLOOD PRESSURE: 96 MMHG | DIASTOLIC BLOOD PRESSURE: 62 MMHG | HEIGHT: 62 IN | BODY MASS INDEX: 25.86 KG/M2 | RESPIRATION RATE: 16 BRPM | HEART RATE: 98 BPM | WEIGHT: 140.5 LBS

## 2022-09-12 DIAGNOSIS — E66.3 OVERWEIGHT (BMI 25.0-29.9): Primary | ICD-10-CM

## 2022-09-12 PROCEDURE — 99213 OFFICE O/P EST LOW 20 MIN: CPT | Performed by: NURSE PRACTITIONER

## 2022-09-12 RX ORDER — PEN NEEDLE, DIABETIC 30 GX3/16"
1 NEEDLE, DISPOSABLE MISCELLANEOUS AS NEEDED
Qty: 100 EACH | Refills: 0 | Status: SHIPPED | OUTPATIENT
Start: 2022-09-12 | End: 2022-09-13

## 2022-09-12 NOTE — ASSESSMENT & PLAN NOTE
Patient's (Body mass index is 25.69 kg/m².) indicates that they are overweight with health conditions that include obstructive sleep apnea, dyslipidemias, GERD and osteoarthritis . Weight is improving with treatment. BMI is is above average; BMI management plan is completed. We discussed low calorie, low carb based diet program, portion control, increasing exercise and an dago-based approach such as [employer] Pal or Lose It.   --This is a follow-up visit patient is down 1.5 pounds.  Currently using Saxenda and tolerating well refill sent in  --Knee replacement (L) this month. Be mindful of food choices with less activity

## 2022-09-13 DIAGNOSIS — E66.3 OVERWEIGHT (BMI 25.0-29.9): ICD-10-CM

## 2022-09-13 RX ORDER — PEN NEEDLE, DIABETIC 32GX 5/32"
NEEDLE, DISPOSABLE MISCELLANEOUS
Qty: 100 EACH | Refills: 0 | Status: SHIPPED | OUTPATIENT
Start: 2022-09-13 | End: 2023-01-03 | Stop reason: SDUPTHER

## 2022-10-10 NOTE — PROGRESS NOTES
[facility]   [address]  [place] [address]  [phone]    Name: Filomena Nixon  : 1960  Patient Care Team:  Kaushal Ledbetter MD as PCP - General (Family Medicine)    Chief Complaint;:   Chief Complaint   Patient presents with   • Follow-up        Patient presents during the COVID-19 pandemic/federally declared state of public health emergency.   This service was conducted via Zoom. Patient is being seen via telehealth service due to current public health emergency.     Virtual visit for Filomena Nixon, a 62 y.o. female, established patient for medical weight loss.     Patient is satisfied with weight loss progress  Hunger control has improved The patient is not exercising. The patient is not using a food journal. Typically has a protein bar in morning and light lunch.  The patient is checking weight regularly. The patient rates current efforts as 4 out of 10. Body mass index is 26.14 kg/m². and has not reached treatment goal. She has had knee replacement since last office visit.     Review of Systems   Constitutional: Negative for appetite change and fatigue.   Eyes: Negative for blurred vision, double vision and visual disturbance.   Cardiovascular: Negative for chest pain and palpitations.   Gastrointestinal: Negative for abdominal pain, constipation, diarrhea, nausea, vomiting and GERD.   Endocrine: Negative for polydipsia, polyphagia and polyuria.   Musculoskeletal: Negative for arthralgias, back pain and myalgias.   Neurological: Negative for dizziness, tremors, light-headedness, headache and memory problem.        Parasthesias negative   Psychiatric/Behavioral: Negative for sleep disturbance, depressed mood and stress. The patient is not nervous/anxious.        Recent Weight History:   Wt Readings from Last 6 Encounters:   10/11/22 64.8 kg (142 lb 14.4 oz)   22 63.7 kg (140 lb 8 oz)   08/15/22 64.4 kg (142 lb)   22 64.5 kg  "(142 lb 4.8 oz)   05/16/22 66.9 kg (147 lb 8 oz)   04/13/22 68.3 kg (150 lb 9.2 oz)       Today's weight: 142.8  Last office visit weight: 142  Change in weight since last visit: +1.5  Start Weight: 169  Total Loss: -26  %Loss of BBW:-15     VS   Vitals:    10/11/22 1014   Pulse: 88   Weight: 64.8 kg (142 lb 14.4 oz)   Height: 157.5 cm (62\")     Physical Exam:    General:  .well developed; well nourished  no acute distress  obese      ASSESSMENT/PLAN:   Diagnoses and all orders for this visit:    1. Overweight (BMI 25.0-29.9) (Primary)  Assessment & Plan:  Patient's (Body mass index is 26.14 kg/m².) indicates that they are overweight with health conditions that include obstructive sleep apnea, dyslipidemias, GERD and osteoarthritis . Weight is improving with treatment. BMI is is above average; BMI management plan is completed. We discussed low calorie, low carb based diet program, portion control, increasing exercise, pharmacologic options including Saxenda and an dago-based approach such as [employer] Pal or Lose It.     --Follow recommendation of surgeon, physical training for use of knee/exercise  --Restart journaling. Gaol to journal daily.   --Goal of 1/2 - 1lb weigh loss per week.   --Considering going into maintainace at next visit.  Will look at BMR and get body comp at next visit.         Note: This was an audio and video enabled telemedicine encounter to comply with physical distancing guidelines during the COVID-19 pandemic.  Consent for televisit was obtained prior to the visit. Refills of controlled substances are being provided in this context because of the state of emergency and current social distancing guidance due to COVID-19. We will resume face-to-face visits as permitted and as advised by public health officials.     I spent 30 minutes on this date of service. This time includes time spent by me in the following activities:preparing for the visit, counseling and educating the " patient/family/caregiver, ordering medications, tests, or procedures and documenting information in the medical record.    Plan of care reviewed with the patient at the conclusion of today's visit. We discussed the risks, benefits, and limitations of treatments. Continue medications and OTC supplements as discussed. Patient verbalizes understanding of and agreement with management plan.      Return in about 4 weeks (around 11/8/2022).      Yojana Nelson, APRN

## 2022-10-11 ENCOUNTER — TELEMEDICINE (OUTPATIENT)
Dept: BARIATRICS/WEIGHT MGMT | Facility: CLINIC | Age: 62
End: 2022-10-11

## 2022-10-11 VITALS — HEIGHT: 62 IN | WEIGHT: 142.9 LBS | HEART RATE: 88 BPM | BODY MASS INDEX: 26.3 KG/M2

## 2022-10-11 DIAGNOSIS — E66.3 OVERWEIGHT (BMI 25.0-29.9): Primary | ICD-10-CM

## 2022-10-11 PROCEDURE — 99214 OFFICE O/P EST MOD 30 MIN: CPT | Performed by: NURSE PRACTITIONER

## 2022-10-11 NOTE — ASSESSMENT & PLAN NOTE
Patient's (Body mass index is 26.14 kg/m².) indicates that they are overweight with health conditions that include obstructive sleep apnea, dyslipidemias, GERD and osteoarthritis . Weight is improving with treatment. BMI is is above average; BMI management plan is completed. We discussed low calorie, low carb based diet program, portion control, increasing exercise, pharmacologic options including Saxenda and an dago-based approach such as [employer] Pal or Lose It.     --Follow recommendation of surgeon, physical training for use of knee/exercise  --Restart journaling. Gaol to journal daily.   --Goal of 1/2 - 1lb weigh loss per week.   --Considering going into maintainace at next visit.  Will look at BMR and get body comp at next visit.

## 2022-11-03 ENCOUNTER — OFFICE VISIT (OUTPATIENT)
Dept: BARIATRICS/WEIGHT MGMT | Facility: CLINIC | Age: 62
End: 2022-11-03

## 2022-11-03 VITALS
OXYGEN SATURATION: 100 % | DIASTOLIC BLOOD PRESSURE: 64 MMHG | WEIGHT: 143.5 LBS | SYSTOLIC BLOOD PRESSURE: 122 MMHG | HEIGHT: 62 IN | HEART RATE: 97 BPM | BODY MASS INDEX: 26.41 KG/M2

## 2022-11-03 DIAGNOSIS — E66.3 OVERWEIGHT (BMI 25.0-29.9): Primary | ICD-10-CM

## 2022-11-03 PROCEDURE — 99214 OFFICE O/P EST MOD 30 MIN: CPT | Performed by: NURSE PRACTITIONER

## 2022-11-03 RX ORDER — ONDANSETRON 4 MG/1
TABLET, FILM COATED ORAL
COMMUNITY
Start: 2022-09-23 | End: 2023-01-03 | Stop reason: ALTCHOICE

## 2022-11-03 RX ORDER — ESTRADIOL 0.03 MG/D
FILM, EXTENDED RELEASE TRANSDERMAL
COMMUNITY
Start: 2022-10-27

## 2022-11-03 RX ORDER — PREGABALIN 75 MG/1
CAPSULE ORAL
COMMUNITY
End: 2023-01-03 | Stop reason: ALTCHOICE

## 2022-11-03 RX ORDER — ASPIRIN 81 MG/1
TABLET ORAL EVERY 12 HOURS SCHEDULED
COMMUNITY

## 2022-11-03 RX ORDER — ACETAMINOPHEN 500 MG
TABLET ORAL EVERY 6 HOURS
COMMUNITY
End: 2023-01-03 | Stop reason: ALTCHOICE

## 2022-11-03 RX ORDER — DOCUSATE SODIUM 100 MG/1
CAPSULE, LIQUID FILLED ORAL
COMMUNITY
Start: 2022-09-23 | End: 2023-01-03

## 2022-11-03 RX ORDER — CEFADROXIL 500 MG/1
CAPSULE ORAL
COMMUNITY
Start: 2022-09-23 | End: 2023-01-03 | Stop reason: ALTCHOICE

## 2022-11-03 RX ORDER — MELOXICAM 15 MG/1
15 TABLET ORAL DAILY
COMMUNITY
Start: 2022-10-20 | End: 2023-01-03 | Stop reason: ALTCHOICE

## 2022-11-03 RX ORDER — OXYCODONE HYDROCHLORIDE 5 MG/1
5 TABLET ORAL EVERY 4 HOURS PRN
COMMUNITY
Start: 2022-09-23 | End: 2023-01-03 | Stop reason: ALTCHOICE

## 2022-11-03 RX ORDER — TIRZEPATIDE 2.5 MG/.5ML
2.5 INJECTION, SOLUTION SUBCUTANEOUS WEEKLY
Qty: 2 ML | Refills: 0 | Status: CANCELLED | OUTPATIENT
Start: 2022-11-02 | End: 2022-12-02

## 2022-11-03 NOTE — PROGRESS NOTES
[facility]  [address]  [place] [address]     Office Note      Date: 2022  Patient Name: Filomena Nixon  MRN: [id number]  : 1960  Subjective  Subjective     Chief Complaint  Obesity Management follow-up     {Problem List  Visit Diagnosis   Encounters  Notes  Medications  Labs  Result Review Imaging  Media :23}     Filomena Nixon presents to [facility] for obesity management.   Patient is satisfied with weight loss progress. Appetite is moderately controlled. Reports no side effects of prescribed medications today. The patient is taking multivitamin and is taking fish oil.  The patient is using a food journal.    The patient is exercising with a FITT score of:    Frequency Intensity Time Strength Training   []   0, none [x]   0 [x]   0 [x]   0   []   1 (1-2x/week) []   1 (light) []   1 (<10 min) []   1 (1x/week)   [x]   2 (3-5x/week) []   2 (moderate) []   2 (10-20 min) []   2 (2x/week)   []   3 (daily) []   3 (moderately hard)  []   4 (very hard) []   3 (20-30 min)  []   4 (>30 min) []   3 (3-4x/week)     Review of Systems   Constitutional: Negative for appetite change and fatigue.   Eyes: Negative for blurred vision, double vision and visual disturbance.   Cardiovascular: Negative for chest pain and palpitations.   Gastrointestinal: Negative for abdominal pain, constipation, diarrhea, nausea, vomiting and GERD.   Endocrine: Negative for polydipsia, polyphagia and polyuria.   Musculoskeletal: Negative for arthralgias, back pain and myalgias.   Neurological: Negative for dizziness, tremors, light-headedness, headache and memory problem.        Parasthesias negative   Psychiatric/Behavioral: Negative for sleep disturbance, depressed mood and stress. The patient is not nervous/anxious.        Objective   Start weight: 169 pounds.    Total Loss lb/%Loss of beginning body weight (BBW): -25.5lb/-15.09%  Change in weight  "since last visit: +3    Body mass index is 26.25 kg/m².   Body composition analysis completed and showed:   %body fat: 30.5  Measurements (in inches)  Waist: 33    Vital Signs:   /64   Pulse 97   Ht 157.5 cm (62\")   Wt 65.1 kg (143 lb 8 oz)   SpO2 100%   BMI 26.25 kg/m²     Physical Exam   General appears stated age and normal appearance   HEENT PERRLA, EOM intact, conjunctivae normal and ***   Chest/lungs {NewPTPECARDIO/PULM:69281::\"Normal rate\",\"Regular rhythm\",\"Breathing is unlabored\",\"Clear to auscultation bilaterally\"}   Extremities {NewPTPEExt:20111::\"without edema\"}   Neuro {NewPTPENeuro:21167::\"Good historian\",\"No focal deficit\"}   Skin {NewPtPESkin:59820::\"Warm, dry, intact\"}   Psych {NewPTPEPsych:48417::\"normal behavior\",\"normal thought content\",\"normal concentration\"}     Result Review :{Labs  Result Review  Imaging  Med Tab  Media :23}   {The following data was reviewed by (Optional):90095}  {Ambulatory Labs (Optional):64925}           Assessment / Plan     {CC Problem List  Visit Diagnosis  ROS  Review (Popup)  Health Maintenance  Quality  BestPractice  Medications  SmartSets  SnapShot Encounters  Media :23}   Diagnoses and all orders for this visit:    1. Overweight (BMI 25.0-29.9) (Primary)  Assessment & Plan:  Patient's (Body mass index is 26.25 kg/m².) indicates that they are overweight with health conditions that include obstructive sleep apnea, dyslipidemias, GERD and osteoarthritis . Weight is worsening. BMI is is above average; BMI management plan is completed. We discussed low calorie, low carb based diet program, portion control, increasing exercise, pharmacologic options including Saxenda and an dago-based approach such as My[employer]itness Pal or Lose It.       {Time Spent (Optional):32939}  Follow Up {Instructions Charge Capture  Follow-up Communications :23}  No follow-ups on file.  Patient was given instructions and counseling regarding her condition or for health " maintenance advice. Please see specific information pulled into the AVS if appropriate.     Yojana Nelson, APRN  11/03/2022

## 2022-11-03 NOTE — PROGRESS NOTES
[facility]  [address]  [place] [address]     Office Note      Date: 2022  Patient Name: Filomena Nixon  MRN: [id number]  : 1960  Subjective  Subjective     Chief Complaint  Obesity Management follow-up          Filomena Nixon presents to [facility] for obesity management.   Patient is satisfied with weight loss progress. Appetite is moderately controlled. Reports no side effects of prescribed medications today. The patient is taking multivitamin and is taking fish oil.  The patient is using a food journal.    The patient is exercising with a FITT score of:     Frequency Intensity Time Strength Training   []?   0, none [x]?   0 [x]?   0 [x]?   0   []?   1 (1-2x/week) []?   1 (light) []?   1 (<10 min) []?   1 (1x/week)   [x]?   2 (3-5x/week) []?   2 (moderate) []?   2 (10-20 min) []?   2 (2x/week)   []?   3 (daily) []?   3 (moderately hard)  []?   4 (very hard)       .  Review of Systems   Constitutional: Negative for appetite change and fatigue.   Eyes: Negative for blurred vision, double vision and visual disturbance.   Cardiovascular: Negative for chest pain and palpitations.   Gastrointestinal: Negative for abdominal pain, constipation, diarrhea, nausea, vomiting and GERD.   Endocrine: Negative for polydipsia, polyphagia and polyuria.   Musculoskeletal: Negative for arthralgias, back pain and myalgias.   Neurological: Negative for dizziness, tremors, light-headedness, headache and memory problem.        Parasthesias negative   Psychiatric/Behavioral: Negative for sleep disturbance, depressed mood and stress. The patient is not nervous/anxious.        Objective   Start weight: 169 pounds.    Total Loss lb/%Loss of beginning body weight (BBW): -25.5lb/-15.09%  Change in weight since last visit: +3     Body mass index is 26.25 kg/m².   Body composition analysis completed and showed:   %body fat: 30.5  Measurements (in  "inches)  Waist: 33    Recent Weight History:   Wt Readings from Last 6 Encounters:   11/03/22 65.1 kg (143 lb 8 oz)   10/11/22 64.8 kg (142 lb 14.4 oz)   09/12/22 63.7 kg (140 lb 8 oz)   08/15/22 64.4 kg (142 lb)   07/14/22 64.5 kg (142 lb 4.8 oz)   05/16/22 66.9 kg (147 lb 8 oz)     Vital Signs:   /64   Pulse 97   Ht 157.5 cm (62\")   Wt 65.1 kg (143 lb 8 oz)   SpO2 100%   BMI 26.25 kg/m²     Physical Exam   Result Review :                Assessment / Plan        Diagnoses and all orders for this visit:    1. Overweight (BMI 25.0-29.9) (Primary)  Assessment & Plan:  Patient's (Body mass index is 26.25 kg/m².) indicates that they are overweight with health conditions that include obstructive sleep apnea, dyslipidemias, GERD and osteoarthritis . Weight is worsening. BMI is is above average; BMI management plan is completed. We discussed low calorie, low carb based diet program, portion control, increasing exercise, pharmacologic options including Saxenda and an dago-based approach such as [employer] Pal or Lose It.   -- This is a follow-up visit and patient is up around 3 pounds since last being seen approximately a month ago.  She has had knee replacement during that time  -- Discussed transitioning out of weight loss into weight maintenance mode.  Start to decrease Saxenda by 0.6 every 2 weeks.  For the next 2 weeks take 2.4 mg daily in the 2 weeks after that 1.8 mg daily.      We discussed the risks, benefits, and limitations of treatments. Continue medications and OTC supplements as discussed. Patient verbalizes understanding of and agreement with management plan.     Follow Up   No follow-ups on file.  Patient was given instructions and counseling regarding her condition or for health maintenance advice. Please see specific information pulled into the AVS if appropriate.     I spent 30 minutes on this date of service. This time includes time spent by me in the following activities:preparing for the " visit, counseling and educating the patient/family/caregiver, ordering medications, tests, or procedures and documenting information in the medical record.    Yojana Nelson, APRN  11/03/2022

## 2022-11-03 NOTE — ASSESSMENT & PLAN NOTE
Patient's (Body mass index is 26.25 kg/m².) indicates that they are overweight with health conditions that include obstructive sleep apnea, dyslipidemias, GERD and osteoarthritis . Weight is worsening. BMI is is above average; BMI management plan is completed. We discussed low calorie, low carb based diet program, portion control, increasing exercise, pharmacologic options including Saxenda and an dago-based approach such as [employer] Pal or Lose It.   -- This is a follow-up visit and patient is up around 3 pounds since last being seen approximately a month ago.  She has had knee replacement during that time  -- Discussed transitioning out of weight loss into weight maintenance mode.  Start to decrease Saxenda by 0.6 every 2 weeks.  For the next 2 weeks take 2.4 mg daily in the 2 weeks after that 1.8 mg daily.  
Normal rate, regular rhythm, normal S1, S2 heart sounds heard.

## 2023-01-02 NOTE — PROGRESS NOTES
[facility]   [address]  [place] [address]  [phone]    Name: Filomena Nixon  : 1960  Patient Care Team:  Kaushal Ledbetter MD as PCP - General (Family Medicine)    Chief Complaint;:   Chief Complaint   Patient presents with   • Follow-up        Patient presents during the COVID-19 pandemic/federally declared state of public health emergency.   This service was conducted via Zoom. Patient is being seen via telehealth service due to current public health emergency.     Virtual visit for Filomena Nixon, a 62 y.o. female, established patient for medical weight loss.     Patient is satisfied with weight loss progress  Hunger control has improved. She decreased Saxenda for a couple days and again increased to 3mg with no side effects.  The patient is exercising, walking more. Plans to start rowing this week. Still restricted relating to recent surgery. The patient is not using a food journal. The patient is checking weight regularly. The patient rates current efforts as 8.5 out of 10. Body mass index is 26.64 kg/m². and has not reached treatment goal.     Review of Systems   Constitutional: Negative for appetite change and fatigue.   Eyes: Negative for blurred vision, double vision and visual disturbance.   Cardiovascular: Negative for chest pain and palpitations.   Gastrointestinal: Positive for constipation (controlled with daily fiber). Negative for abdominal pain, diarrhea, nausea, vomiting and GERD.   Endocrine: Negative for polydipsia, polyphagia and polyuria.   Musculoskeletal: Negative for arthralgias, back pain and myalgias.   Neurological: Negative for dizziness, tremors, light-headedness, headache and memory problem.        Parasthesias negative   Psychiatric/Behavioral: Negative for sleep disturbance, depressed mood and stress. The patient is not nervous/anxious.        The patient is exercising with a FITT score of:    Frequency    Intensity Time Strength Training   []   0 None  []   0 None  []   0 None  [x]   0 None    []   1 (1-2x/week) []   1 (light) []   1 (<10 min) []   1 (1x/week)   [x]   2 (3-5x/week) [x]   2 (moderate) []   2 (10-20 min) []   2 (2x/week)   []   3 (daily)   []   3 (moderately hard)  []   4 (very hard) []   3 (20-30 min)  [x]   4 (>30 min) []   3 (3-4x/week)       Recent Weight History:   Wt Readings from Last 6 Encounters:   01/03/23 64 kg (141 lb)   11/03/22 65.1 kg (143 lb 8 oz)   10/11/22 64.8 kg (142 lb 14.4 oz)   09/12/22 63.7 kg (140 lb 8 oz)   08/15/22 64.4 kg (142 lb)   07/14/22 64.5 kg (142 lb 4.8 oz)       Today's weight: 141  Last office visit weight: 143.5  Change in weight since last visit: -2.5  Start Weight: 169  Total Loss: -28  %Loss of BBW:-16.5     VS   Vitals:    01/03/23 1516   Weight: 64 kg (141 lb)   Height: 154.9 cm (61\")       Physical Exam:    General:  .well developed; well nourished  no acute distress  obese      ASSESSMENT/PLAN:   Diagnoses and all orders for this visit:    1. Overweight (BMI 25.0-29.9)  Assessment & Plan:  Patient's (Body mass index is 26.64 kg/m².) indicates that they are overweight with health conditions that include obstructive sleep apnea, dyslipidemias, GERD and osteoarthritis . Weight is improving with treatment. BMI is is above average; BMI management plan is completed. We discussed low calorie, low carb based diet program, portion control, increasing exercise, pharmacologic options including Saxenda and an dago-based approach such as [facility] Pal or Lose It.   -- This is a follow-up visit and patient is down around 2-1/2 pounds since last being seen approximately a month ago  -- Continue Saxenda.  Prescription sent in for a 90-day supply.  --    Orders:  -     Liraglutide (SAXENDA) 18 MG/3ML injection pen; Inject 3 mg under the skin into the appropriate area as directed Daily for 90 days. Titrate up an instructed starting at 0.6mg daily  Dispense: 45 mL; Refill:  0  -     Insulin Pen Needle (BD Pen Needle Gloria U/F) 32G X 4 MM misc; Inject 100 each under the skin into the appropriate area as directed Daily for 100 days.  Dispense: 100 each; Refill: 1     Note: This was an audio and video enabled telemedicine encounter to comply with physical distancing guidelines during the COVID-19 pandemic.  Consent for televisit was obtained prior to the visit. Refills of controlled substances are being provided in this context because of the state of emergency and current social distancing guidance due to COVID-19. We will resume face-to-face visits as permitted and as advised by public health officials.     I spent 30 minutes on this date of service. This time includes time spent by me in the following activities:preparing for the visit, counseling and educating the patient/family/caregiver, ordering medications, tests, or procedures and documenting information in the medical record.    Plan of care reviewed with the patient at the conclusion of today's visit. We discussed the risks, benefits, and limitations of treatments. Continue medications and OTC supplements as discussed. Patient verbalizes understanding of and agreement with management plan.      Return in about 4 weeks (around 1/31/2023).      JANETTE Stone

## 2023-01-03 ENCOUNTER — TELEMEDICINE (OUTPATIENT)
Dept: BARIATRICS/WEIGHT MGMT | Facility: CLINIC | Age: 63
End: 2023-01-03
Payer: COMMERCIAL

## 2023-01-03 VITALS — HEIGHT: 61 IN | BODY MASS INDEX: 26.62 KG/M2 | WEIGHT: 141 LBS

## 2023-01-03 DIAGNOSIS — E66.3 OVERWEIGHT (BMI 25.0-29.9): ICD-10-CM

## 2023-01-03 PROCEDURE — 99214 OFFICE O/P EST MOD 30 MIN: CPT | Performed by: NURSE PRACTITIONER

## 2023-01-03 RX ORDER — PEN NEEDLE, DIABETIC 32GX 5/32"
100 NEEDLE, DISPOSABLE MISCELLANEOUS DAILY
Qty: 100 EACH | Refills: 1 | Status: SHIPPED | OUTPATIENT
Start: 2023-01-03 | End: 2023-04-13

## 2023-01-03 NOTE — ASSESSMENT & PLAN NOTE
Patient's (Body mass index is 26.64 kg/m².) indicates that they are overweight with health conditions that include obstructive sleep apnea, dyslipidemias, GERD and osteoarthritis . Weight is improving with treatment. BMI is is above average; BMI management plan is completed. We discussed low calorie, low carb based diet program, portion control, increasing exercise, pharmacologic options including Saxenda and an dago-based approach such as [employer] Pal or Lose It.   -- This is a follow-up visit and patient is down around 2-1/2 pounds since last being seen approximately a month ago  -- Continue Saxenda.  Prescription sent in for a 90-day supply.  --

## 2023-03-09 ENCOUNTER — TELEMEDICINE (OUTPATIENT)
Dept: BARIATRICS/WEIGHT MGMT | Facility: CLINIC | Age: 63
End: 2023-03-09
Payer: COMMERCIAL

## 2023-03-09 VITALS — WEIGHT: 140.6 LBS | BODY MASS INDEX: 26.55 KG/M2 | HEIGHT: 61 IN

## 2023-03-09 DIAGNOSIS — E66.3 OVERWEIGHT (BMI 25.0-29.9): Primary | ICD-10-CM

## 2023-03-09 PROCEDURE — 99214 OFFICE O/P EST MOD 30 MIN: CPT | Performed by: NURSE PRACTITIONER

## 2023-03-09 NOTE — ASSESSMENT & PLAN NOTE
Patient's (Body mass index is 26.57 kg/m².) indicates that they are overweight with health conditions that include obstructive sleep apnea, dyslipidemias, GERD and osteoarthritis . Weight is unchanged. BMI is is above average; BMI management plan is completed. We discussed low calorie, low carb based diet program, portion control, increasing exercise, pharmacologic options including Saxenda and an dago-based approach such as [employer] Pal or Lose It.   --This is a follow up visit and at goal in maintinance phrase.   --

## 2023-03-09 NOTE — PROGRESS NOTES
[facility]   [address]  [place] [address]  [phone]    Name: Filomena Nixon  : 1960  Patient Care Team:  Kaushal Ledbetter MD as PCP - General (Family Medicine)    Chief Complaint;:   Chief Complaint   Patient presents with   • Follow-up        Patient presents during the COVID-19 pandemic/federally declared state of public health emergency.   This service was conducted via Zoom. Patient is being seen via telehealth service due to current public health emergency.     Virtual visit for Filomena Nixon, a 62 y.o. female, established patient for medical weight loss.     Patient is satisfied with weight loss progress  Hunger control has improved The patient is exercising. The patient is using a food journal. The patient is checking weight regularly.  Body mass index is 26.57 kg/m². and has reached treatment goal. She did attempt to reduce Saxenda dosage and gained a few pounds increased back to 3.0mg daily     Review of Systems   Constitutional: Negative for appetite change and fatigue.   Eyes: Negative for blurred vision, double vision and visual disturbance.   Cardiovascular: Negative for chest pain and palpitations.   Gastrointestinal: Negative for abdominal pain, constipation, diarrhea, nausea, vomiting and GERD.   Endocrine: Negative for polydipsia, polyphagia and polyuria.   Musculoskeletal: Negative for arthralgias, back pain and myalgias.   Neurological: Negative for dizziness, tremors, light-headedness, headache and memory problem.        Parasthesias negative   Psychiatric/Behavioral: Negative for sleep disturbance, depressed mood and stress. The patient is not nervous/anxious.        The patient is exercising with a FITT score of:    Frequency   Intensity Time Strength Training   []   0 None  []   0 None  []   0 None  []   0 None    []   1 (1-2x/week) []   1 (light) []   1 (<10 min) []   1 (1x/week)   []   2 (3-5x/week) [x]   2  "(moderate) []   2 (10-20 min) []   2 (2x/week)   [x]   3 (daily)   []   3 (moderately hard)  []   4 (very hard) []   3 (20-30 min)  [x]   4 (>30 min) []   3 (3-4x/week)       Recent Weight History:   Wt Readings from Last 6 Encounters:   03/09/23 63.8 kg (140 lb 9.6 oz)   01/03/23 64 kg (141 lb)   11/03/22 65.1 kg (143 lb 8 oz)   10/11/22 64.8 kg (142 lb 14.4 oz)   09/12/22 63.7 kg (140 lb 8 oz)   08/15/22 64.4 kg (142 lb)       Today's weight: 140.6  Last office visit weight: 141  Change in weight since last visit: -0.4  Start Weight: 169  Total Loss: -29  %Loss of BBW:-17%     VS   Vitals:    03/09/23 1056   Weight: 63.8 kg (140 lb 9.6 oz)   Height: 154.9 cm (61\")       Physical Exam:    General:  .well developed; well nourished  no acute distress  obese      ASSESSMENT/PLAN:   Diagnoses and all orders for this visit:    1. Overweight (BMI 25.0-29.9) (Primary)  Assessment & Plan:  Patient's (Body mass index is 26.57 kg/m².) indicates that they are overweight with health conditions that include obstructive sleep apnea, dyslipidemias, GERD and osteoarthritis . Weight is unchanged. BMI is is above average; BMI management plan is completed. We discussed low calorie, low carb based diet program, portion control, increasing exercise, pharmacologic options including Saxenda and an dago-based approach such as [employer] Pal or Lose It.   --This is a follow up visit and at goal in maintinance phrase.   --    Orders:  -     Liraglutide (SAXENDA) 18 MG/3ML injection pen; Inject 3 mg under the skin into the appropriate area as directed Daily for 90 days. Titrate up an instructed starting at 0.6mg daily  Dispense: 45 mL; Refill: 0      Note: This was an audio and video enabled telemedicine encounter to comply with physical distancing guidelines during the COVID-19 pandemic.  Consent for televisit was obtained prior to the visit. Refills of controlled substances are being provided in this context because of the state of emergency " and current social distancing guidance due to COVID-19. We will resume face-to-face visits as permitted and as advised by public health officials.     I spent 30 minutes on this date of service. This time includes time spent by me in the following activities:preparing for the visit, counseling and educating the patient/family/caregiver, ordering medications, tests, or procedures and documenting information in the medical record.    Plan of care reviewed with the patient at the conclusion of today's visit. We discussed the risks, benefits, and limitations of treatments. Continue medications and OTC supplements as discussed. Patient verbalizes understanding of and agreement with management plan.      Return in about 2 months (around 5/9/2023).      JANETTE Stone

## 2023-05-15 ENCOUNTER — OFFICE VISIT (OUTPATIENT)
Dept: BARIATRICS/WEIGHT MGMT | Facility: CLINIC | Age: 63
End: 2023-05-15
Payer: COMMERCIAL

## 2023-05-15 VITALS
HEART RATE: 90 BPM | HEIGHT: 61 IN | DIASTOLIC BLOOD PRESSURE: 82 MMHG | SYSTOLIC BLOOD PRESSURE: 120 MMHG | OXYGEN SATURATION: 98 % | BODY MASS INDEX: 26.88 KG/M2 | WEIGHT: 142.4 LBS

## 2023-05-15 DIAGNOSIS — R73.09 ELEVATED HEMOGLOBIN A1C: ICD-10-CM

## 2023-05-15 DIAGNOSIS — E66.3 OVERWEIGHT (BMI 25.0-29.9): ICD-10-CM

## 2023-05-15 DIAGNOSIS — R73.03 PRE-DIABETES: Primary | ICD-10-CM

## 2023-05-15 PROCEDURE — 99214 OFFICE O/P EST MOD 30 MIN: CPT | Performed by: NURSE PRACTITIONER

## 2023-05-15 NOTE — ASSESSMENT & PLAN NOTE
Patient's (Body mass index is 26.91 kg/m².) indicates that they are overweight with health conditions that include obstructive sleep apnea, dyslipidemias, GERD and osteoarthritis . Weight is unchanged. BMI is is above average; BMI management plan is completed. We discussed low calorie, low carb based diet program, portion control, increasing exercise, pharmacologic options including Saxenda and an dago-based approach such as [employer] Pal or Lose It.   --This is a follow up visit and she is up around 1.5 lbs  --Continue Saxenda, 3m sent  --A1C today  --Reviewed Body comp.  She currently is within a healthy fat percentage and range for someone her age and her sex but on the upper limit of this.  She still has desire to lose around 5 pounds which is well within the healthy range.  Calorie and macro goals set up based just below basal metabolic rate.

## 2023-05-15 NOTE — PROGRESS NOTES
[facility]  [address]  [place] [address]     Office Note      Date: 05/15/2023  Patient Name: Filomena Nixon  MRN: [id number]  : 1960  Subjective  Subjective     Chief Complaint  Obesity Management follow-up    Filomena Nixon presents to [facility] for obesity management.     Patient is satisfied with weight loss progress. Appetite is moderately controlled. Reports no side effects of prescribed medications today.  She is currently taking Saxenda 3.0.  Currently being seen every 2 months as she is in maintenance phase.  The patient is taking multivitamin and is taking fish oil.  The patient is not using a food journal.  The patient rates current efforts as 7 out of 10.  The patient is exercising with a FITT score of:    Frequency Intensity Time Strength Training   []   0, none []   0 []   0 [x]   0   []   1 (1-2x/week) []   1 (light) []   1 (<10 min) []   1 (1x/week)   []   2 (3-5x/week) []   2 (moderate) []   2 (10-20 min) []   2 (2x/week)   [x]   3 (daily) [x]   3 (moderately hard)  []   4 (very hard) [x]   3 (20-30 min)  []   4 (>30 min) []   3 (3-4x/week)     Review of Systems   Constitutional: Negative for appetite change and fatigue.   Eyes: Negative for blurred vision, double vision and visual disturbance.   Cardiovascular: Negative for chest pain and palpitations.   Gastrointestinal: Negative for abdominal pain, constipation, diarrhea, nausea, vomiting and GERD.   Endocrine: Negative for polydipsia, polyphagia and polyuria.   Musculoskeletal: Negative for arthralgias, back pain and myalgias.   Neurological: Negative for dizziness, tremors, light-headedness, headache and memory problem.        Parasthesias negative   Psychiatric/Behavioral: Negative for sleep disturbance, depressed mood and stress. The patient is not nervous/anxious.        Objective   Start weight: 169 pounds.    Total Loss lb/%Loss of beginning body  "weight (BBW): -26.6lb/-15.74%  Change in weight since last visit: +1.4    Recent Weight History:   Wt Readings from Last 6 Encounters:   05/15/23 64.6 kg (142 lb 6.4 oz)   03/09/23 63.8 kg (140 lb 9.6 oz)   01/03/23 64 kg (141 lb)   11/03/22 65.1 kg (143 lb 8 oz)   10/11/22 64.8 kg (142 lb 14.4 oz)   09/12/22 63.7 kg (140 lb 8 oz)       Body mass index is 26.91 kg/m².   Body composition analysis completed and showed:   %body fat: 34.8  Measurements (in inches)  Waist: 35    Vital Signs:   /82   Pulse 90   Ht 154.9 cm (61\")   Wt 64.6 kg (142 lb 6.4 oz)   SpO2 98%   BMI 26.91 kg/m²       Physical Exam  Vitals reviewed.   Constitutional:       Appearance: She is well-developed.      Comments: overweight   Pulmonary:      Effort: Pulmonary effort is normal.   Neurological:      Mental Status: She is alert.   Psychiatric:         Attention and Perception: Attention normal.         Mood and Affect: Mood normal.         Speech: Speech normal.         Behavior: Behavior normal.        Result Review :                  Assessment / Plan        Diagnoses and all orders for this visit:    1. Pre-diabetes (Primary)  -     Hemoglobin A1c    2. Overweight (BMI 25.0-29.9)  Assessment & Plan:  Patient's (Body mass index is 26.91 kg/m².) indicates that they are overweight with health conditions that include obstructive sleep apnea, dyslipidemias, GERD and osteoarthritis . Weight is unchanged. BMI is is above average; BMI management plan is completed. We discussed low calorie, low carb based diet program, portion control, increasing exercise, pharmacologic options including Saxenda and an dago-based approach such as [employer] Pal or Lose It.   --This is a follow up visit and she is up around 1.5 lbs  --Continue Saxenda, 3m sent  --A1C today  --Reviewed Body comp.  She currently is within a healthy fat percentage and range for someone her age and her sex but on the upper limit of this.  She still has desire to lose around 5 " pounds which is well within the healthy range.  Calorie and macro goals set up based just below basal metabolic rate.    Orders:  -     Hemoglobin A1c  -     Liraglutide (SAXENDA) 18 MG/3ML injection pen; Inject 3 mg under the skin into the appropriate area as directed Daily for 90 days. Titrate up an instructed starting at 0.6mg daily  Dispense: 45 mL; Refill: 0    3. Elevated hemoglobin A1c  -     Hemoglobin A1c      We discussed the risks, benefits, and limitations of treatments. Continue medications and OTC supplements as discussed. Patient verbalizes understanding of and agreement with management plan.     Follow Up   Return in about 2 months (around 7/15/2023).  Patient was given instructions and counseling regarding her condition or for health maintenance advice. Please see specific information pulled into the AVS if appropriate.     I spent 30 minutes on this date of service. This time includes time spent by me in the following activities:preparing for the visit, counseling and educating the patient/family/caregiver, ordering medications, tests, or procedures and documenting information in the medical record.    Yojana Nelson, APRN  05/15/2023

## 2023-05-20 ENCOUNTER — PATIENT MESSAGE (OUTPATIENT)
Dept: BARIATRICS/WEIGHT MGMT | Facility: CLINIC | Age: 63
End: 2023-05-20
Payer: COMMERCIAL

## 2023-05-20 DIAGNOSIS — E66.3 OVERWEIGHT (BMI 25.0-29.9): ICD-10-CM

## 2023-05-20 DIAGNOSIS — R73.09 ELEVATED HEMOGLOBIN A1C: Primary | ICD-10-CM

## 2023-05-20 DIAGNOSIS — Z79.899 LONG-TERM USE OF HIGH-RISK MEDICATION: ICD-10-CM

## 2023-05-23 NOTE — TELEPHONE ENCOUNTER
----- Message from Tony Vega MD sent at 12/20/2022  4:53 PM CST -----  Serum potassium is a little elevated. Renal function is normal.  Would like to recheck potassium level on Friday to ensure that potassium does not continue to rise as higher potassium levels could increase risk of heart arrhythmias.  Please advise patient to stay well hydrated over the next 3 days.  Please assist in scheduling nonfasting lab appointment for Friday.  Lab order placed.    Lipids reviewed.  LDL (bad cholesterol) 117. Would recommend moderate dose statin to decrease LDL level to less than 70.  Discussed with patient at visit with me.  If patient has reconsidered, and would like to start new medication, please let me know.    PSA, A1 c, and urine microalbumin results still pending.     From: Filomena Nixon  To: Yojana Nelson  Sent: 5/20/2023 11:02 AM EDT  Subject: Blood test    We talked about an A1C test when I was there Monday but I don’t know if it’s been ordered. I’ll be out of town until Wednesday and can get it then.

## 2023-05-24 ENCOUNTER — LAB (OUTPATIENT)
Dept: LAB | Facility: HOSPITAL | Age: 63
End: 2023-05-24
Payer: COMMERCIAL

## 2023-05-24 LAB — HBA1C MFR BLD: 5 % (ref 4.8–5.6)

## 2023-05-24 PROCEDURE — 83036 HEMOGLOBIN GLYCOSYLATED A1C: CPT | Performed by: NURSE PRACTITIONER

## 2023-06-07 ENCOUNTER — PRIOR AUTHORIZATION (OUTPATIENT)
Dept: BARIATRICS/WEIGHT MGMT | Facility: CLINIC | Age: 63
End: 2023-06-07
Payer: COMMERCIAL

## 2023-09-07 NOTE — PROGRESS NOTES
[facility]  [address]  [place] [address]     Office Note      Date: 2023  Patient Name: Filomena Nixon  MRN: [id number]  : 1960    Subjective     Chief Complaint  Obesity Management follow-up    Filomena Nixon presents to [facility] for obesity management.     Patient is satisfied with weight loss progress. Appetite is moderately controlled. Reports no side effects of prescribed medications today. The patient is taking multivitamin and is taking fish oil.  The patient is using a food journal.  The patient rates current efforts as 8.5 out of 10. He has noticed that she has increased her late night snacks with things like ice cream sandwiches.     The patient is exercising with a FITT score of: 7    Frequency Intensity Time Strength Training   []   0, none []   0 []   0 [x]   0   []   1 (1-2x/week) [x]   1 (light) []   1 (<10 min) []   1 (1x/week)   []   2 (3-5x/week) []   2 (moderate) []   2 (10-20 min) []   2 (2x/week)   [x]   3 (daily) []   3 (moderately hard)  []   4 (very hard) [x]   3 (20-30 min)  []   4 (>30 min) []   3 (3-4x/week)     Review of Systems   Constitutional:  Negative for appetite change and fatigue.   Eyes:  Negative for blurred vision, double vision and visual disturbance.   Cardiovascular:  Negative for chest pain and palpitations.   Gastrointestinal:  Positive for indigestion. Negative for abdominal pain, constipation, diarrhea, nausea, vomiting and GERD.   Endocrine: Negative for polydipsia, polyphagia and polyuria.   Musculoskeletal:  Negative for arthralgias, back pain and myalgias.   Neurological:  Negative for dizziness, tremors, light-headedness, headache and memory problem.        Parasthesias negative   Psychiatric/Behavioral:  Negative for sleep disturbance, depressed mood and stress. The patient is not nervous/anxious.    All other systems reviewed and are negative.    Objective   Start  "weight: 169 pounds.    Total Loss lb/%Loss of beginning body weight (BBW): -24.2 lb/-14.32%  Change in weight since last visit: +2.8    Recent Weight History:   Wt Readings from Last 6 Encounters:   09/11/23 65.7 kg (144 lb 12.8 oz)   07/10/23 64.4 kg (142 lb)   05/15/23 64.6 kg (142 lb 6.4 oz)   03/09/23 63.8 kg (140 lb 9.6 oz)   01/03/23 64 kg (141 lb)   11/03/22 65.1 kg (143 lb 8 oz)       Body mass index is 27.36 kg/m².   Body composition analysis completed and showed:   %body fat: 36.0  Measurements (in inches)  Waist: 33    Vital Signs:   /68 (BP Location: Left arm, Patient Position: Sitting)   Pulse 90   Resp 16   Ht 154.9 cm (61\")   Wt 65.7 kg (144 lb 12.8 oz)   SpO2 99%   BMI 27.36 kg/m²       Physical Exam  Vitals reviewed.   Constitutional:       Appearance: She is obese. She is not ill-appearing.   Pulmonary:      Effort: Pulmonary effort is normal.   Neurological:      Mental Status: She is alert.   Psychiatric:         Attention and Perception: Attention and perception normal.         Behavior: Behavior is cooperative.      Result Review :                Assessment / Plan        Diagnoses and all orders for this visit:    1. Overweight (BMI 25.0-29.9) (Primary)  Assessment & Plan:  Patient's (Body mass index is 27.36 kg/m².) indicates that they are overweight with health conditions that include obstructive sleep apnea, dyslipidemias, and GERD . Weight is worsening. BMI is is above average; BMI management plan is completed. We discussed low calorie, low carb based diet program, portion control, increasing exercise, pharmacologic options including Wegovy, and an dago-based approach such as [employer] Pal or Lose It.   -- This is a follow-up visit and patient is up around 2-1/2 pounds since last being seen around 2 months ago.  --Currently taking Saxenda but we are switching to Wegovy due to supply shortages.  Prior approval message sent to staff.  --Still working on reaching the goal of 140 or " below.  --Be mindful of sweet late night snacks.  -- If she tolerates well at 1.7 for Wegovy our goal is to increase to 2.4 at next office visit.    Orders:  -     Semaglutide-Weight Management (Wegovy) 1.7 MG/0.75ML solution auto-injector; Inject 0.75 mL under the skin into the appropriate area as directed 1 (One) Time Per Week for 30 days.  Dispense: 3 mL; Refill: 0        We discussed the risks, benefits, and limitations of treatments. Continue medications and OTC supplements as discussed. Patient verbalizes understanding of and agreement with management plan.     Follow Up   Return in about 4 weeks (around 10/9/2023).  Patient was given instructions and counseling regarding her condition or for health maintenance advice. Please see specific information pulled into the AVS if appropriate.     I spent 30 minutes on this date of service. This time includes time spent by me in the following activities:preparing for the visit, counseling and educating the patient/family/caregiver, ordering medications, tests, or procedures and documenting information in the medical record.    Yojana Nelson, APRN  09/11/2023

## 2023-09-11 ENCOUNTER — PRIOR AUTHORIZATION (OUTPATIENT)
Dept: BARIATRICS/WEIGHT MGMT | Facility: CLINIC | Age: 63
End: 2023-09-11
Payer: COMMERCIAL

## 2023-09-11 ENCOUNTER — OFFICE VISIT (OUTPATIENT)
Dept: BARIATRICS/WEIGHT MGMT | Facility: CLINIC | Age: 63
End: 2023-09-11
Payer: COMMERCIAL

## 2023-09-11 VITALS
HEIGHT: 61 IN | BODY MASS INDEX: 27.34 KG/M2 | SYSTOLIC BLOOD PRESSURE: 104 MMHG | OXYGEN SATURATION: 99 % | DIASTOLIC BLOOD PRESSURE: 68 MMHG | RESPIRATION RATE: 16 BRPM | WEIGHT: 144.8 LBS | HEART RATE: 90 BPM

## 2023-09-11 DIAGNOSIS — E66.3 OVERWEIGHT (BMI 25.0-29.9): Primary | ICD-10-CM

## 2023-09-11 PROCEDURE — 99214 OFFICE O/P EST MOD 30 MIN: CPT | Performed by: NURSE PRACTITIONER

## 2023-09-11 PROCEDURE — 96372 THER/PROPH/DIAG INJ SC/IM: CPT | Performed by: NURSE PRACTITIONER

## 2023-09-11 RX ORDER — SEMAGLUTIDE 1.7 MG/.75ML
1.7 INJECTION, SOLUTION SUBCUTANEOUS WEEKLY
Qty: 3 ML | Refills: 0 | Status: SHIPPED | OUTPATIENT
Start: 2023-09-11 | End: 2023-10-11

## 2023-09-11 RX ADMIN — CYANOCOBALAMIN 1000 MCG: 1000 INJECTION, SOLUTION INTRAMUSCULAR; SUBCUTANEOUS at 10:03

## 2023-10-29 RX ORDER — SEMAGLUTIDE 1.7 MG/.75ML
1.7 INJECTION, SOLUTION SUBCUTANEOUS WEEKLY
Qty: 3 ML | Refills: 0 | Status: CANCELLED | OUTPATIENT
Start: 2023-10-29 | End: 2023-11-28

## 2023-10-29 NOTE — PROGRESS NOTES
"  [facility]  [address]  [place] [address]     Office Note      Date: 10/30/2023  Patient Name: Filomena Nixon  MRN: [id number]  : 1960    --switched from saxenda to wegovy, keep at 1.7?     Subjective     Chief Complaint  Obesity Management follow-up    Filomena Nixon presents to [facility] for obesity management.  {Problem List  Visit Diagnosis   Encounters  Notes  Medications  Labs  Result Review Imaging  Media :23}   Patient is {Satisfied/unsatisfied/unsure:97487} with weight loss progress. Appetite is {poor/mod/well:77524}. Reports no side effects of prescribed medications today. The patient {is / IS NOT:16622::\"is not\"} taking multivitamin and {is / IS NOT:33312::\"is not\"} taking fish oil.  The patient {is / IS NOT:16426::\"is not\"} using a food journal.  The patient rates current efforts as *** out of 10.  The patient is exercising with a FITT score of:    Frequency Intensity Time Strength Training   []   0, none []   0 []   0 []   0   []   1 (1-2x/week) []   1 (light) []   1 (<10 min) []   1 (1x/week)   []   2 (3-5x/week) []   2 (moderate) []   2 (10-20 min) []   2 (2x/week)   []   3 (daily) []   3 (moderately hard)  []   4 (very hard) []   3 (20-30 min)  []   4 (>30 min) []   3 (3-4x/week)     Review of Systems    Objective   Start weight: *** pounds.    Total Loss lb/%Loss of beginning body weight (BBW): ***lb/***%  Change in weight since last visit: ***    Recent Weight History:   Wt Readings from Last 6 Encounters:   23 65.7 kg (144 lb 12.8 oz)   07/10/23 64.4 kg (142 lb)   05/15/23 64.6 kg (142 lb 6.4 oz)   23 63.8 kg (140 lb 9.6 oz)   23 64 kg (141 lb)   22 65.1 kg (143 lb 8 oz)       There is no height or weight on file to calculate BMI.   Body composition analysis completed and showed:   %body fat: ***  Measurements (in inches)  Waist: ***    Vital Signs:   There were no vitals taken " for this visit.      Physical Exam   Result Review :{Labs  Result Review  Imaging  Med Tab  Media :23}   {The following data was reviewed by (Optional):79093}  {Ambulatory Labs (Optional):04615}           Assessment / Plan     {CC Problem List  Visit Diagnosis  ROS  Review (Popup)  Health Maintenance  Quality  BestPractice  Medications  SmartSets  SnapShot Encounters  Media :23}   There are no diagnoses linked to this encounter.    We discussed the risks, benefits, and limitations of treatments. Continue medications and OTC supplements as discussed. Patient verbalizes understanding of and agreement with management plan.     Follow Up {Instructions Charge Capture  Follow-up Communications :23}  No follow-ups on file.  Patient was given instructions and counseling regarding her condition or for health maintenance advice. Please see specific information pulled into the AVS if appropriate.     I spent *** minutes on this date of service. This time includes time spent by me in the following activities:preparing for the visit, counseling and educating the patient/family/caregiver, ordering medications, tests, or procedures and documenting information in the medical record.    Yojana Nelson, APRN  10/30/2023

## 2023-10-30 ENCOUNTER — TELEMEDICINE (OUTPATIENT)
Dept: BARIATRICS/WEIGHT MGMT | Facility: CLINIC | Age: 63
End: 2023-10-30
Payer: COMMERCIAL

## 2023-10-30 VITALS — BODY MASS INDEX: 27.23 KG/M2 | HEIGHT: 61 IN | WEIGHT: 144.2 LBS

## 2023-10-30 DIAGNOSIS — E66.3 OVERWEIGHT (BMI 25.0-29.9): Primary | ICD-10-CM

## 2023-10-30 PROCEDURE — 99214 OFFICE O/P EST MOD 30 MIN: CPT | Performed by: NURSE PRACTITIONER

## 2023-10-30 RX ORDER — SEMAGLUTIDE 2.4 MG/.75ML
2.4 INJECTION, SOLUTION SUBCUTANEOUS WEEKLY
Qty: 3 ML | Refills: 0 | Status: SHIPPED | OUTPATIENT
Start: 2023-10-30 | End: 2023-11-29

## 2023-10-30 NOTE — ASSESSMENT & PLAN NOTE
Patient's (Body mass index is 27.25 kg/m².) indicates that they are overweight with health conditions that include obstructive sleep apnea, dyslipidemias, GERD, and osteoarthritis . Weight is improving with treatment. BMI is is above average; BMI management plan is completed. We discussed low calorie, low carb based diet program, portion control, increasing exercise, pharmacologic options including Wegovy, and an dago-based approach such as [employer] Pal or Lose It.   --This is follow-up visit and patient is down around half a pound since last being seen approximately 6 weeks ago.  -- At last visit we had switched from Saxenda to Wegovy 1.7 which she tolerated well.  A to titrate up to 2.4 mg today, refill sent in.  -- Reports do an excellent job of food journaling hitting around 25 out of 30 days this past month.  But this great effort.  -- Current goal is still to get below 140 currently just slightly over 144 pounds.

## 2023-10-30 NOTE — PROGRESS NOTES
Office Note      Date: 10/30/2023  Patient Name: Filomena Nixon  MRN: [id number]  : 1960    This service was conducted via [employer].  Patient is located in [place]  Provider is located at her office address. The use of a video visit has been reviewed with the patient and informed consent has been obtained.    Subjective     Chief Complaint  Obesity Management follow-up    Subjective          Filomena Nixon presents to [facility] via telehealth for obesity management.       Patient is satisfied with weight loss progress. Appetite is well controlled. Reports no side effects of prescribed medications today.  She did titrate over from Saxenda 3.0 to Wegovy 1.7 with no vat adverse effects.  Reports that she is currently walking around a couple miles a day currently not doing any strength training.    The patient is exercising with a FITT score of:    Frequency Intensity Time Strength Training   []   0, none []   0 []   0 [x]   0   []   1 (1-2x/week) []   1 (light) []   1 (<10 min) []   1 (1x/week)   []   2 (3-5x/week) [x]   2 (moderate) []   2 (10-20 min) []   2 (2x/week)   [x]   3 (daily) []   3 (moderately hard)  []   4 (very hard) []   3 (20-30 min)  [x]   4 (>30 min) []   3 (3-4x/week)       Review of Systems   Constitutional:  Negative for appetite change and fatigue.   Eyes:  Negative for blurred vision, double vision and visual disturbance.   Cardiovascular:  Negative for chest pain and palpitations.   Gastrointestinal:  Negative for abdominal pain, constipation, diarrhea, nausea, vomiting and GERD.   Endocrine: Negative for polydipsia, polyphagia and polyuria.   Musculoskeletal:  Negative for arthralgias, back pain and myalgias.   Neurological:  Negative for dizziness, tremors, light-headedness, headache and memory problem.        Parasthesias negative   Psychiatric/Behavioral:  Negative for sleep disturbance, depressed mood and stress. The patient is not  "nervous/anxious.        Objective   Body mass index is 27.25 kg/m².  Start weight: 169 pounds.    Total weight loss: 25 pounds/-14%  Change in weight since last visit: .05    Wt Readings from Last 6 Encounters:   10/30/23 1336 65.4 kg (144 lb 3.2 oz)   09/11/23 0938 65.7 kg (144 lb 12.8 oz)   07/10/23 1106 64.4 kg (142 lb)   05/15/23 1120 64.6 kg (142 lb 6.4 oz)   03/09/23 1056 63.8 kg (140 lb 9.6 oz)   01/03/23 1516 64 kg (141 lb)     General:  .well developed; well nourished  no acute distress  obese        Ht 154.9 cm (61\")   Wt 65.4 kg (144 lb 3.2 oz)   BMI 27.25 kg/m²       Result Review :     Common labs          5/24/2023    09:27   Common Labs   Hemoglobin A1C 5.00                Assessment and Plan    Diagnoses and all orders for this visit:    1. Overweight (BMI 25.0-29.9) (Primary)  Assessment & Plan:  Patient's (Body mass index is 27.25 kg/m².) indicates that they are overweight with health conditions that include obstructive sleep apnea, dyslipidemias, GERD, and osteoarthritis . Weight is improving with treatment. BMI is is above average; BMI management plan is completed. We discussed low calorie, low carb based diet program, portion control, increasing exercise, pharmacologic options including Wegovy, and an dago-based approach such as [employer] Pal or Lose It.   --This is follow-up visit and patient is down around half a pound since last being seen approximately 6 weeks ago.  -- At last visit we had switched from Saxenda to Wegovy 1.7 which she tolerated well.  A to titrate up to 2.4 mg today, refill sent in.  -- Reports do an excellent job of food journaling hitting around 25 out of 30 days this past month.  But this great effort.  -- Current goal is still to get below 140 currently just slightly over 144 pounds.      Orders:  -     Semaglutide-Weight Management (Wegovy) 2.4 MG/0.75ML solution auto-injector; Inject 2.4 mg under the skin into the appropriate area as directed 1 (One) Time Per Week for " 30 days.  Dispense: 3 mL; Refill: 0      We discussed the risks, benefits, and limitations of treatments. Continue medications and OTC supplements as discussed. Patient verbalizes understanding of and agreement with management plan.       Follow Up   Return in about 4 weeks (around 11/27/2023).  I spent 30 minutes on this date of service. This time includes time spent by me in the following activities:preparing for the visit, counseling and educating the patient/family/caregiver, ordering medications, tests, or procedures and documenting information in the medical record.  Patient was given instructions and counseling regarding her condition or for health maintenance advice. Please see specific information pulled into the AVS if appropriate.     JANETTE Stone

## 2023-11-30 ENCOUNTER — OFFICE VISIT (OUTPATIENT)
Dept: BARIATRICS/WEIGHT MGMT | Facility: CLINIC | Age: 63
End: 2023-11-30
Payer: COMMERCIAL

## 2023-11-30 VITALS
HEIGHT: 61 IN | SYSTOLIC BLOOD PRESSURE: 120 MMHG | HEART RATE: 84 BPM | DIASTOLIC BLOOD PRESSURE: 76 MMHG | WEIGHT: 142 LBS | BODY MASS INDEX: 26.81 KG/M2 | OXYGEN SATURATION: 95 %

## 2023-11-30 DIAGNOSIS — E66.3 OVERWEIGHT (BMI 25.0-29.9): Primary | ICD-10-CM

## 2023-11-30 RX ORDER — SEMAGLUTIDE 2.4 MG/.75ML
2.4 INJECTION, SOLUTION SUBCUTANEOUS WEEKLY
Qty: 9 ML | Refills: 0 | Status: SHIPPED | OUTPATIENT
Start: 2023-11-30 | End: 2024-02-28

## 2023-11-30 RX ADMIN — CYANOCOBALAMIN 1000 MCG: 1000 INJECTION, SOLUTION INTRAMUSCULAR; SUBCUTANEOUS at 15:27

## 2023-11-30 NOTE — ASSESSMENT & PLAN NOTE
Patient's (Body mass index is 26.83 kg/m².) indicates that they are overweight with health conditions that include obstructive sleep apnea, dyslipidemias, GERD, and osteoarthritis . Weight is improving with treatment. BMI is is above average; BMI management plan is completed. We discussed low calorie, low carb based diet program, portion control, increasing exercise, pharmacologic options including Wegovy, and an dago-based approach such as [employer] Pal or Lose It.   --This is a follow up visit and she is down around2.3 lbs  --Goal to add row machine in addition to walking  --Continue Wegovy 2.4. Discussed adding 1/2 cap miralax for constipation prevention for 2 weeks.

## 2023-11-30 NOTE — PROGRESS NOTES
[facility]  [address]  [place] [address]     Office Note      Date: 2023  Patient Name: Filomena Nixon  MRN: [id number]  : 1960    Subjective     Chief Complaint  Obesity Management follow-up    Filomena Nixon presents to [facility] for obesity management.     Patient is satisfied with weight loss progress. Appetite is moderately controlled. Reports no side effects of prescribed medications today. The patient is taking multivitamin and is taking fish oil.  The patient is using a food journal.  The patient rates current efforts as 9 out of 10.  The patient is exercising with a FITT score of:    Frequency Intensity Time Strength Training   []   0, none []   0 []   0 [x]   0   []   1 (1-2x/week) [x]   1 (light) []   1 (<10 min) []   1 (1x/week)   []   2 (3-5x/week) []   2 (moderate) []   2 (10-20 min) []   2 (2x/week)   [x]   3 (daily) []   3 (moderately hard)  []   4 (very hard) [x]   3 (20-30 min)  []   4 (>30 min) []   3 (3-4x/week)     Review of Systems   Constitutional:  Negative for appetite change and fatigue.   Eyes:  Negative for blurred vision, double vision and visual disturbance.   Cardiovascular:  Negative for chest pain and palpitations.   Gastrointestinal:  Negative for abdominal pain, constipation, diarrhea, nausea, vomiting and GERD.   Endocrine: Negative for polydipsia, polyphagia and polyuria.   Musculoskeletal:  Negative for arthralgias, back pain and myalgias.   Neurological:  Negative for dizziness, tremors, light-headedness, headache and memory problem.        Parasthesias negative   Psychiatric/Behavioral:  Negative for sleep disturbance, depressed mood and stress. The patient is not nervous/anxious.        Objective   Start weight: 169 pounds.    Total Loss lb/%Loss of beginning body weight (BBW): -27lb/-15.98%  Change in weight since last visit: -2.3    Recent Weight History:   Wt Readings from Last  "6 Encounters:   11/30/23 64.4 kg (142 lb)   11/09/23 64.4 kg (142 lb)   10/30/23 65.4 kg (144 lb 3.2 oz)   09/11/23 65.7 kg (144 lb 12.8 oz)   07/10/23 64.4 kg (142 lb)   05/15/23 64.6 kg (142 lb 6.4 oz)     Body mass index is 26.83 kg/m².   Body composition analysis completed and showed:   Body Fat %: 22.5    Measurements (in inches)  Waist Circumference: 34    Vital Signs:   /76 (BP Location: Left arm, Patient Position: Sitting)   Pulse 84   Ht 154.9 cm (61\")   Wt 64.4 kg (142 lb)   SpO2 95%   BMI 26.83 kg/m²       Physical Exam  Vitals reviewed.   Constitutional:       Appearance: She is well-developed.      Comments: overweight   Pulmonary:      Effort: Pulmonary effort is normal.   Neurological:      Mental Status: She is alert.   Psychiatric:         Attention and Perception: Attention normal.         Mood and Affect: Mood normal.         Speech: Speech normal.         Behavior: Behavior normal.        Result Review :     Common labs          5/24/2023    09:27   Common Labs   Hemoglobin A1C 5.00               Assessment / Plan        Diagnoses and all orders for this visit:    1. Overweight (BMI 25.0-29.9) (Primary)  Assessment & Plan:  Patient's (Body mass index is 26.83 kg/m².) indicates that they are overweight with health conditions that include obstructive sleep apnea, dyslipidemias, GERD, and osteoarthritis . Weight is improving with treatment. BMI is is above average; BMI management plan is completed. We discussed low calorie, low carb based diet program, portion control, increasing exercise, pharmacologic options including Wegovy, and an dago-based approach such as [employer] Pal or Lose It.   --This is a follow up visit and she is down around2.3 lbs  --Goal to add row machine in addition to walking  --Continue Wegovy 2.4. Discussed adding 1/2 cap miralax for constipation prevention for 2 weeks.     Orders:  -     Semaglutide-Weight Management (Wegovy) 2.4 MG/0.75ML solution auto-injector; " Inject 2.4 mg under the skin into the appropriate area as directed 1 (One) Time Per Week for 90 days.  Dispense: 9 mL; Refill: 0        We discussed the risks, benefits, and limitations of treatments. Continue medications and OTC supplements as discussed. Patient verbalizes understanding of and agreement with management plan.     Follow Up   Return in about 4 weeks (around 12/28/2023).  Patient was given instructions and counseling regarding her condition or for health maintenance advice. Please see specific information pulled into the AVS if appropriate.     I spent 30 minutes on this date of service. This time includes time spent by me in the following activities:preparing for the visit, counseling and educating the patient/family/caregiver, ordering medications, tests, or procedures and documenting information in the medical record.    Yojana Nelson, APRN  11/30/2023

## 2024-02-12 DIAGNOSIS — E66.3 OVERWEIGHT (BMI 25.0-29.9): ICD-10-CM

## 2024-02-12 RX ORDER — SEMAGLUTIDE 2.4 MG/.75ML
2.4 INJECTION, SOLUTION SUBCUTANEOUS WEEKLY
Qty: 9 ML | Refills: 0 | Status: SHIPPED | OUTPATIENT
Start: 2024-02-12 | End: 2024-05-12

## 2024-02-26 RX ORDER — SEMAGLUTIDE 2.4 MG/.75ML
2.4 INJECTION, SOLUTION SUBCUTANEOUS WEEKLY
Qty: 9 ML | Refills: 0 | Status: CANCELLED | OUTPATIENT
Start: 2024-02-26 | End: 2024-05-26

## 2024-02-26 NOTE — PROGRESS NOTES
Office Note      Date: 2024  Patient Name: Filomena Nixon  MRN: [id number]  : 1960    This service was conducted via [employer].  Patient is located in [place]  Provider is located at her office address. The use of a video visit has been reviewed with the patient and informed consent has been obtained.      Subjective     Chief Complaint  Obesity Management follow-up    Subjective          Filomena Nixon presents to [facility] via telehealth for obesity management. Reports she is in a good rhythm of eating and has reached her goal weight to day. She just filled a 3 months supply of 2.4 wegovy. She is feeling good at this weight. She also reports ductal breast surgery will take place prior to next visit and she was advised to remain off medication for a week prior to surgery.     Patient is satisfied with weight loss progress. Appetite is well controlled. Reports no side effects of prescribed medications today.   The patient is exercising with a FITT score of:    Frequency Intensity Time Strength Training   []   0, none []   0 []   0 [x]   0   []   1 (1-2x/week) [x]   1 (light) []   1 (<10 min) []   1 (1x/week)   [x]   2 (3-5x/week) []   2 (moderate) []   2 (10-20 min) []   2 (2x/week)   []   3 (daily) []   3 (moderately hard)  []   4 (very hard) []   3 (20-30 min)  [x]   4 (>30 min) []   3 (3-4x/week)       Review of Systems   Constitutional:  Negative for appetite change and fatigue.   Eyes:  Negative for visual disturbance.   Cardiovascular:  Negative for chest pain and palpitations.   Gastrointestinal:  Negative for constipation and indigestion.   Neurological:  Negative for light-headedness.       Objective   Body mass index is 25.53 kg/m².  Start weight: 169 pounds.    Total weight loss: 34 pounds/-20%  Change in weight since last visit: -7    Wt Readings from Last 6 Encounters:   24 0952 61.3 kg (135 lb 1.6 oz)   23 1454 64.4 kg (142 lb)   23  1120 64.4 kg (142 lb)   10/30/23 1336 65.4 kg (144 lb 3.2 oz)   09/11/23 0938 65.7 kg (144 lb 12.8 oz)   07/10/23 1106 64.4 kg (142 lb)       General:  .well developed; well nourished  no acute distress  obese      /72   Wt 61.3 kg (135 lb 1.6 oz)   BMI 25.53 kg/m²       Result Review :     Common labs          5/24/2023    09:27   Common Labs   Hemoglobin A1C 5.00                Assessment and Plan    Diagnoses and all orders for this visit:    1. Overweight (BMI 25.0-29.9) (Primary)  Assessment & Plan:  Patient's (Body mass index is 25.53 kg/m².) indicates that they are overweight with health conditions that include dyslipidemias, GERD, and osteoarthritis . Weight is improving with treatment. BMI is is above average; BMI management plan is completed. We discussed low calorie, low carb based diet program, portion control, increasing exercise, pharmacologic options including Wegovy, and an dago-based approach such as [employer] Pal or Lose It.   -- Okay to continue Wegovy 2.4 mg for the next 2 months.  We will look at body composition analysis and fat percentage to determine if within normal range.  Possible dose adjustment if so.  --Okay to skip 1 week of Wegovy and restart at same dose postsurgery.  --GLP-1 labs due at next office visit      2. History of obesity    3. Hyperlipidemia, unspecified hyperlipidemia type      We discussed the risks, benefits, and limitations of treatments. Continue medications and OTC supplements as discussed. Patient verbalizes understanding of and agreement with management plan.       Follow Up   Return in about 2 months (around 4/29/2024).Patient was given instructions and counseling regarding her condition or for health maintenance advice. Please see specific information pulled into the AVS if appropriate.     JANETTE Stone

## 2024-02-29 ENCOUNTER — TELEMEDICINE (OUTPATIENT)
Dept: BARIATRICS/WEIGHT MGMT | Facility: CLINIC | Age: 64
End: 2024-02-29
Payer: COMMERCIAL

## 2024-02-29 VITALS — DIASTOLIC BLOOD PRESSURE: 72 MMHG | BODY MASS INDEX: 25.53 KG/M2 | SYSTOLIC BLOOD PRESSURE: 128 MMHG | WEIGHT: 135.1 LBS

## 2024-02-29 DIAGNOSIS — E66.3 OVERWEIGHT (BMI 25.0-29.9): Primary | ICD-10-CM

## 2024-02-29 DIAGNOSIS — Z86.39 HISTORY OF OBESITY: ICD-10-CM

## 2024-02-29 DIAGNOSIS — E78.5 HYPERLIPIDEMIA, UNSPECIFIED HYPERLIPIDEMIA TYPE: ICD-10-CM

## 2024-02-29 NOTE — ASSESSMENT & PLAN NOTE
Patient's (Body mass index is 25.53 kg/m².) indicates that they are overweight with health conditions that include dyslipidemias, GERD, and osteoarthritis . Weight is improving with treatment. BMI is is above average; BMI management plan is completed. We discussed low calorie, low carb based diet program, portion control, increasing exercise, pharmacologic options including Wegovy, and an dago-based approach such as [employer] Pal or Lose It.   -- Okay to continue Wegovy 2.4 mg for the next 2 months.  We will look at body composition analysis and fat percentage to determine if within normal range.  Possible dose adjustment if so.  --Okay to skip 1 week of Wegovy and restart at same dose postsurgery.  --GLP-1 labs due at next office visit

## 2024-04-28 NOTE — PROGRESS NOTES
[facility]  [address]  [place] [address]     Office Note      Date: 2024  Patient Name: Filomena Nixon  MRN: [id number]  : 1960    Subjective     Chief Complaint  Obesity Management follow-up    Filomena Nixon presents to [facility] for obesity management.     Patient is satisfied with weight loss progress. Appetite is moderately controlled. Reports no side effects of prescribed medications today. She is taking Wegovy 2.4mg but ready to start nitrating into maintenance phase. The patient is taking multivitamin and is taking fish oil.  The patient is using a food journal.  The patient rates current efforts as 9 out of 10.    The patient is exercising with a FITT score of:    Frequency Intensity Time Strength Training   []   0, none []   0 []   0 [x]   0   []   1 (1-2x/week) [x]   1 (light) []   1 (<10 min) []   1 (1x/week)   []   2 (3-5x/week) []   2 (moderate) []   2 (10-20 min) []   2 (2x/week)   [x]   3 (daily) []   3 (moderately hard)  []   4 (very hard) [x]   3 (20-30 min)  []   4 (>30 min) []   3 (3-4x/week)     Review of Systems   Constitutional:  Negative for appetite change and fatigue.   Eyes:  Negative for visual disturbance.   Cardiovascular:  Negative for chest pain and palpitations.   Gastrointestinal:  Negative for constipation and indigestion.   Neurological:  Negative for light-headedness.   All other systems reviewed and are negative.    Objective   Start weight: 169 pounds.    Total Loss lb/%Loss of beginning body weight (BBW): -34.6 lb/-20.47%  Change in weight since last visit: -0.7    Recent Weight History:   Wt Readings from Last 6 Encounters:   24 61 kg (134 lb 6.4 oz)   24 61.3 kg (135 lb 1.6 oz)   23 64.4 kg (142 lb)   23 64.4 kg (142 lb)   10/30/23 65.4 kg (144 lb 3.2 oz)   23 65.7 kg (144 lb 12.8 oz)     Body mass index is 25.39 kg/m².   Body composition analysis  "completed and showed:   Body Fat %: 32.7    Measurements (in inches)  Waist Circumference: 32    Vital Signs:   /68 (BP Location: Right arm, Patient Position: Sitting)   Pulse 79   Resp 16   Ht 154.9 cm (61\")   Wt 61 kg (134 lb 6.4 oz)   SpO2 99%   BMI 25.39 kg/m²       Physical Exam  Vitals reviewed.   Constitutional:       Appearance: She is well-developed.      Comments: overweight   Pulmonary:      Effort: Pulmonary effort is normal.   Neurological:      Mental Status: She is alert.   Psychiatric:         Attention and Perception: Attention normal.         Mood and Affect: Mood normal.         Speech: Speech normal.         Behavior: Behavior normal.        Result Review :     Common labs          5/24/2023    09:27   Common Labs   Hemoglobin A1C 5.00          Assessment / Plan        Diagnoses and all orders for this visit:    1. Overweight (BMI 25.0-29.9) (Primary)  Assessment & Plan:  Patient's (Body mass index is 25.39 kg/m².) indicates that they are overweight with health conditions that include dyslipidemias, GERD, and osteoarthritis . Weight is improving with treatment. BMI is is above average; BMI management plan is completed. We discussed low calorie, low carb based diet program, portion control, increasing exercise, pharmacologic options including wegovy, and an dago-based approach such as [employer] Pal or Lose It.     --This is a follow up and she is down around 0.7lbs  --Reduce Wegovy from 2.4mg to 1.7mg. We are transitioning maintenance phase.  Based on her body composition analysis for fat percentage of fat mass is now within the healthiest range for someone her age and sex.  Patient does have desire to continue to reduce medications preferably coming off completely if weight is maintained.  --Continue to log foods and weigh daily.  --Okay to continue seeing each other every 2 months or sooner if needed.    Orders:  -     Semaglutide-Weight Management (Wegovy) 1.7 MG/0.75ML solution " auto-injector; Inject 0.75 mL under the skin into the appropriate area as directed 1 (One) Time Per Week for 56 days.  Dispense: 3 mL; Refill: 1    2. Encounter for long-term current use of medication  -     Comprehensive Metabolic Panel; Future  -     Hemoglobin A1c; Future  -     Lipid Panel; Future      We discussed the risks, benefits, and limitations of treatments. Continue medications and OTC supplements as discussed. Patient verbalizes understanding of and agreement with management plan.     Follow Up   Return in about 4 weeks (around 5/28/2024).  Patient was given instructions and counseling regarding her condition or for health maintenance advice. Please see specific information pulled into the AVS if appropriate.     I spent 30 minutes on this date of service. This time includes time spent by me in the following activities:preparing for the visit, counseling and educating the patient/family/caregiver, ordering medications, tests, or procedures and documenting information in the medical record.    Yojana Nelson, APRN  04/30/2024

## 2024-04-30 ENCOUNTER — PRIOR AUTHORIZATION (OUTPATIENT)
Dept: BARIATRICS/WEIGHT MGMT | Facility: CLINIC | Age: 64
End: 2024-04-30
Payer: COMMERCIAL

## 2024-04-30 ENCOUNTER — OFFICE VISIT (OUTPATIENT)
Dept: BARIATRICS/WEIGHT MGMT | Facility: CLINIC | Age: 64
End: 2024-04-30
Payer: COMMERCIAL

## 2024-04-30 VITALS
DIASTOLIC BLOOD PRESSURE: 68 MMHG | SYSTOLIC BLOOD PRESSURE: 104 MMHG | WEIGHT: 134.4 LBS | HEIGHT: 61 IN | HEART RATE: 79 BPM | RESPIRATION RATE: 16 BRPM | OXYGEN SATURATION: 99 % | BODY MASS INDEX: 25.37 KG/M2

## 2024-04-30 DIAGNOSIS — Z79.899 ENCOUNTER FOR LONG-TERM CURRENT USE OF MEDICATION: ICD-10-CM

## 2024-04-30 DIAGNOSIS — E66.3 OVERWEIGHT (BMI 25.0-29.9): Primary | ICD-10-CM

## 2024-04-30 PROCEDURE — 99214 OFFICE O/P EST MOD 30 MIN: CPT | Performed by: NURSE PRACTITIONER

## 2024-04-30 RX ORDER — SEMAGLUTIDE 1.7 MG/.75ML
1.7 INJECTION, SOLUTION SUBCUTANEOUS WEEKLY
Qty: 3 ML | Refills: 1 | Status: SHIPPED | OUTPATIENT
Start: 2024-04-30 | End: 2024-06-25

## 2024-04-30 NOTE — ASSESSMENT & PLAN NOTE
Patient's (Body mass index is 25.39 kg/m².) indicates that they are overweight with health conditions that include dyslipidemias, GERD, and osteoarthritis . Weight is improving with treatment. BMI is is above average; BMI management plan is completed. We discussed low calorie, low carb based diet program, portion control, increasing exercise, pharmacologic options including wegovy, and an dago-based approach such as [employer] Pal or Lose It.     --This is a follow up and she is down around 0.7lbs  --Reduce Wegovy from 2.4mg to 1.7mg. We are transitioning maintenance phase.  Based on her body composition analysis for fat percentage of fat mass is now within the healthiest range for someone her age and sex.  Patient does have desire to continue to reduce medications preferably coming off completely if weight is maintained.  --Continue to log foods and weigh daily.  --Okay to continue seeing each other every 2 months or sooner if needed.

## 2024-05-01 ENCOUNTER — LAB (OUTPATIENT)
Dept: LAB | Facility: HOSPITAL | Age: 64
End: 2024-05-01
Payer: COMMERCIAL

## 2024-05-01 DIAGNOSIS — Z79.899 ENCOUNTER FOR LONG-TERM CURRENT USE OF MEDICATION: ICD-10-CM

## 2024-05-01 DIAGNOSIS — Z79.899 LONG-TERM USE OF HIGH-RISK MEDICATION: ICD-10-CM

## 2024-05-01 DIAGNOSIS — R73.09 ELEVATED HEMOGLOBIN A1C: ICD-10-CM

## 2024-05-01 DIAGNOSIS — E66.3 OVERWEIGHT (BMI 25.0-29.9): ICD-10-CM

## 2024-05-01 LAB
ALBUMIN SERPL-MCNC: 4.6 G/DL (ref 3.5–5.2)
ALBUMIN/GLOB SERPL: 2.2 G/DL
ALP SERPL-CCNC: 73 U/L (ref 39–117)
ALT SERPL W P-5'-P-CCNC: 17 U/L (ref 1–33)
ANION GAP SERPL CALCULATED.3IONS-SCNC: 13.4 MMOL/L (ref 5–15)
AST SERPL-CCNC: 22 U/L (ref 1–32)
BILIRUB SERPL-MCNC: 1.1 MG/DL (ref 0–1.2)
BUN SERPL-MCNC: 14 MG/DL (ref 8–23)
BUN/CREAT SERPL: 17.3 (ref 7–25)
CALCIUM SPEC-SCNC: 10.1 MG/DL (ref 8.6–10.5)
CHLORIDE SERPL-SCNC: 103 MMOL/L (ref 98–107)
CHOLEST SERPL-MCNC: 183 MG/DL (ref 0–200)
CO2 SERPL-SCNC: 24.6 MMOL/L (ref 22–29)
CREAT SERPL-MCNC: 0.81 MG/DL (ref 0.57–1)
EGFRCR SERPLBLD CKD-EPI 2021: 81.7 ML/MIN/1.73
GLOBULIN UR ELPH-MCNC: 2.1 GM/DL
GLUCOSE SERPL-MCNC: 87 MG/DL (ref 65–99)
HBA1C MFR BLD: 4.9 % (ref 4.8–5.6)
HDLC SERPL-MCNC: 74 MG/DL (ref 40–60)
LDLC SERPL CALC-MCNC: 94 MG/DL (ref 0–100)
LDLC/HDLC SERPL: 1.26 {RATIO}
POTASSIUM SERPL-SCNC: 4 MMOL/L (ref 3.5–5.2)
PROT SERPL-MCNC: 6.7 G/DL (ref 6–8.5)
SODIUM SERPL-SCNC: 141 MMOL/L (ref 136–145)
TRIGL SERPL-MCNC: 80 MG/DL (ref 0–150)
VLDLC SERPL-MCNC: 15 MG/DL (ref 5–40)

## 2024-05-01 PROCEDURE — 80053 COMPREHEN METABOLIC PANEL: CPT

## 2024-05-01 PROCEDURE — 83036 HEMOGLOBIN GLYCOSYLATED A1C: CPT

## 2024-05-01 PROCEDURE — 80061 LIPID PANEL: CPT

## 2024-05-01 PROCEDURE — 36415 COLL VENOUS BLD VENIPUNCTURE: CPT

## 2024-05-08 ENCOUNTER — OFFICE VISIT (OUTPATIENT)
Dept: ORTHOPEDIC SURGERY | Facility: CLINIC | Age: 64
End: 2024-05-08
Payer: COMMERCIAL

## 2024-05-08 VITALS
BODY MASS INDEX: 24.92 KG/M2 | HEIGHT: 61 IN | WEIGHT: 132 LBS | SYSTOLIC BLOOD PRESSURE: 114 MMHG | DIASTOLIC BLOOD PRESSURE: 70 MMHG

## 2024-05-08 DIAGNOSIS — M77.42 METATARSALGIA OF LEFT FOOT: Primary | ICD-10-CM

## 2024-05-08 PROCEDURE — 99213 OFFICE O/P EST LOW 20 MIN: CPT | Performed by: ORTHOPAEDIC SURGERY

## 2024-05-08 RX ORDER — SERTRALINE HYDROCHLORIDE 100 MG/1
1 TABLET, FILM COATED ORAL DAILY
COMMUNITY
Start: 2023-11-30

## 2024-05-22 ENCOUNTER — OFFICE VISIT (OUTPATIENT)
Dept: ORTHOPEDIC SURGERY | Facility: CLINIC | Age: 64
End: 2024-05-22
Payer: COMMERCIAL

## 2024-05-22 VITALS
WEIGHT: 132 LBS | HEIGHT: 61 IN | DIASTOLIC BLOOD PRESSURE: 72 MMHG | SYSTOLIC BLOOD PRESSURE: 114 MMHG | BODY MASS INDEX: 24.92 KG/M2

## 2024-05-22 DIAGNOSIS — M77.42 METATARSALGIA OF LEFT FOOT: Primary | ICD-10-CM

## 2024-05-22 RX ORDER — TRIAMCINOLONE ACETONIDE 40 MG/ML
1 INJECTION, SUSPENSION INTRA-ARTICULAR; INTRAMUSCULAR
Status: COMPLETED | OUTPATIENT
Start: 2024-05-22 | End: 2024-05-22

## 2024-05-22 RX ORDER — LIDOCAINE HYDROCHLORIDE 10 MG/ML
0.5 INJECTION, SOLUTION EPIDURAL; INFILTRATION; INTRACAUDAL; PERINEURAL
Status: COMPLETED | OUTPATIENT
Start: 2024-05-22 | End: 2024-05-22

## 2024-05-22 RX ADMIN — TRIAMCINOLONE ACETONIDE 1 ML: 40 INJECTION, SUSPENSION INTRA-ARTICULAR; INTRAMUSCULAR at 09:30

## 2024-05-22 RX ADMIN — LIDOCAINE HYDROCHLORIDE 0.5 ML: 10 INJECTION, SOLUTION EPIDURAL; INFILTRATION; INTRACAUDAL; PERINEURAL at 09:30

## 2024-05-22 NOTE — PROGRESS NOTES
ESTABLISHED PATIENT    Patient: Filomena Nixon  : 1960    Primary Care Provider: Kaushal Ledbetter MD    Requesting Provider: As above    Follow-up (2 week follow-up: Metatarsalgia of left foot)      History    Chief Complaint: She is here for metatarsalgia    History of Present Illness: She returns unexpectedly, she notes that she would like an injection in the left second MTPJ as we discussed previously    Current Outpatient Medications on File Prior to Visit   Medication Sig Dispense Refill    aspirin 81 MG EC tablet Daily.      atorvastatin (LIPITOR) 10 MG tablet Take 1 tablet by mouth every night at bedtime.      Cholecalciferol (Vitamin D3) 1.25 MG (52396 UT) capsule Take 1 capsule by mouth Daily.      etodolac (LODINE) 500 MG tablet Take 1 tablet by mouth 2 (two) times a day.      MULTIPLE VITAMIN PO Multiple Vitamin capsule   Daily      Omega-3 Fatty Acids (FISH OIL) 1000 MG capsule capsule Take 1 capsule by mouth Daily With Breakfast.      OMEPRAZOLE PO Take 20 mg by mouth Daily.      Psyllium (GENFIBER PO) Genfiber (psyllium-dextrose) oral powder   As Directed      Semaglutide-Weight Management (Wegovy) 1.7 MG/0.75ML solution auto-injector Inject 0.75 mL under the skin into the appropriate area as directed 1 (One) Time Per Week for 56 days. 3 mL 1    sertraline (ZOLOFT) 100 MG tablet Take 1 tablet by mouth Daily.      TURMERIC PO Take  by mouth.       Current Facility-Administered Medications on File Prior to Visit   Medication Dose Route Frequency Provider Last Rate Last Admin    cyanocobalamin injection 1,000 mcg  1,000 mcg Intramuscular Q28 Days Yojana Nelson APRN   1,000 mcg at 23 1527      Allergies   Allergen Reactions    Hydrocodone-Acetaminophen Itching    Codeine Hives and Itching    Morphine Unknown - Low Severity      Past Medical History:   Diagnosis Date    Arthritis     Breast cancer 2024    CTS (carpal tunnel syndrome) 2019    Both wrists repaired    Heart burn   "   Hyperlipemia     Knee swelling 2012    Obstructive sleep apnea     Osteoarthritis     PCOS (polycystic ovarian syndrome)     Prediabetes     Tear of meniscus of knee     Right knee Repaired 2021     Past Surgical History:   Procedure Laterality Date    CARPAL TUNNEL RELEASE      HAND SURGERY      HYSTERECTOMY      KNEE SURGERY Left     arthroscopy     Family History   Problem Relation Age of Onset    Osteoarthritis Mother     Diabetes Mother     Hypertension Father       Social History     Socioeconomic History    Marital status:    Tobacco Use    Smoking status: Never     Passive exposure: Past    Smokeless tobacco: Never   Vaping Use    Vaping status: Never Used   Substance and Sexual Activity    Alcohol use: Yes     Alcohol/week: 4.0 standard drinks of alcohol     Types: 4 Glasses of wine per week    Drug use: No    Sexual activity: Yes     Partners: Male     Birth control/protection: Bilateral salpingectomy , Hysterectomy        Review of Systems   Constitutional: Negative.    HENT: Negative.     Eyes: Negative.    Respiratory: Negative.     Cardiovascular: Negative.    Gastrointestinal: Negative.    Endocrine: Negative.    Genitourinary: Negative.    Musculoskeletal:  Positive for arthralgias.   Skin: Negative.    Allergic/Immunologic: Negative.    Neurological: Negative.    Hematological: Negative.    Psychiatric/Behavioral: Negative.         The following portions of the patient's history were reviewed and updated as appropriate: allergies, current medications, past family history, past medical history, past social history, past surgical history, and problem list.    Physical Exam:   /72   Ht 153.7 cm (60.5\")   Wt 59.9 kg (132 lb)   BMI 25.36 kg/m²     Tender second MTPJ    Medical Decision Making    Data Review:   none    Assessment/Plan/Diagnosis/Treatment Options:   1. Metatarsalgia of left foot  She would like to try an injection as we discussed.  I emphasized that it is extremely " important that she use tape or the Budin splint every day for at least 3 months.  Again I recommend only 1 injection, because of increased risk of dislocation    After discussing the risks (including infection, skin atrophy, etc), time out was called,  the left 2nd MTPJ was prepped and using sterile technique injected with 0.5cc of 1% lidocaine and 1cc (40mg) triamcinolone.  A band aid was applied.  No complications.         Lor Castellanos MD

## 2024-05-22 NOTE — PROGRESS NOTES
Procedure   Small Joint Arthrocentesis: L second MTP  Consent given by: patient  Site marked: site marked  Timeout: Immediately prior to procedure a time out was called to verify the correct patient, procedure, equipment, support staff and site/side marked as required   Supporting Documentation  Indications: pain   Procedure Details  Location: second toe - L second MTP  Preparation: Patient was prepped and draped in the usual sterile fashion  Needle gauge: 21 g.  Medications administered: 0.5 mL lidocaine PF 1% 1 %; 1 mL triamcinolone acetonide 40 MG/ML  Patient tolerance: patient tolerated the procedure well with no immediate complications

## 2024-06-20 NOTE — PROGRESS NOTES
[facility]  [address]  [place] [address]     Office Note      Date: 2024  Patient Name: Filomena Nixon  MRN: [id number]  : 1960    Subjective     Chief Complaint  Obesity Management follow-up    Filomena Nixon presents to [facility] for obesity management.       Patient is satisfied with weight loss progress. Appetite is moderately controlled. Reports no side effects of prescribed medications today. The patient is taking multivitamin and is taking fish oil.  The patient is not using a food journal.  The patient rates current efforts as 8 out of 10. She is currently taking Wegovy 1.7mg that was reduced at last visit from 2.4mg and tolerating well. Reports she has finished radiation since last visit and ready to start walking again.     The patient is exercising with a FITT score of:    Frequency Intensity Time Strength Training   [x]   0, none [x]   0 [x]   0 -   0   []   1 (1-2x/week) []   1 (light) []   1 (<10 min) []   1 (1x/week)   []   2 (3-5x/week) []   2 (moderate) []   2 (10-20 min) []   2 (2x/week)   []   3 (daily) []   3 (moderately hard)  []   4 (very hard) []   3 (20-30 min)  []   4 (>30 min) []   3 (3-4x/week)     Review of Systems   Constitutional:  Negative for appetite change and fatigue.   Eyes:  Negative for visual disturbance.   Cardiovascular:  Negative for chest pain and palpitations.   Gastrointestinal:  Negative for constipation and indigestion.   Neurological:  Negative for light-headedness.   All other systems reviewed and are negative.      Objective   Start weight: 169 pounds.    Total Loss lb/%Loss of beginning body weight (BBW): -37lb/-22%  Change in weight since last visit: -2.4    Recent Weight History:   Wt Readings from Last 6 Encounters:   24 59.9 kg (132 lb)   24 59.9 kg (132 lb)   24 59.9 kg (132 lb)   24 61 kg (134 lb 6.4 oz)   24 61.3 kg (135 lb 1.6 oz)  "  11/30/23 64.4 kg (142 lb)     Body mass index is 24.94 kg/m².   Body composition analysis completed and showed:   Body Fat %: 32.7    Measurements (in inches)  Waist Circumference: 35.5    Vital Signs:   /80 (BP Location: Right arm, Patient Position: Sitting)   Pulse 91   Ht 154.9 cm (61\")   Wt 59.9 kg (132 lb)   BMI 24.94 kg/m²       Physical Exam  Vitals reviewed.   Constitutional:       Appearance: She is well-developed.   Pulmonary:      Effort: Pulmonary effort is normal.   Neurological:      Mental Status: She is alert.   Psychiatric:         Attention and Perception: Attention normal.         Mood and Affect: Mood normal.         Speech: Speech normal.         Behavior: Behavior normal.      Result Review :     Common labs          5/1/2024    09:27   Common Labs   Glucose 87    BUN 14    Creatinine 0.81    Sodium 141    Potassium 4.0    Chloride 103    Calcium 10.1    Albumin 4.6    Total Bilirubin 1.1    Alkaline Phosphatase 73    AST (SGOT) 22    ALT (SGPT) 17    Total Cholesterol 183    Triglycerides 80    HDL Cholesterol 74    LDL Cholesterol  94    Hemoglobin A1C 4.90          Assessment / Plan        Diagnoses and all orders for this visit:    1. Encounter for weight management (Primary)  Assessment & Plan:  Patient's (Body mass index is 24.94 kg/m².) indicates that they are overweight with health conditions that include dyslipidemias . Weight is improving with treatment. BMI is is above average; BMI management plan is completed. We discussed low calorie, low carb based diet program, portion control, increasing exercise, pharmacologic options including Wegovy, and an dago-based approach such as [employer] Pal or Lose It.     -This is a follow-up visit and patient is down 2.4 pounds since last being seen approximately 2 months ago.  -At last visit she titrated from 2.4 mg Wegovy to 1.7 mg Wegovy.  Plan to stay at 1.7 mg for the least the next 2 months.  Body composition was reviewed and she is " within the healthy fat mass and percentage for someone her age and sex but on the upper end of healthy.  She has desire to be a bit more on the middle to lower end of healthy fat percentage.  - Recent labs reviewed and A1c, lipid and CMP.  -She is working her way up to mindful movement post radiation.  Keep up this great effort.      Orders:  -     Semaglutide-Weight Management (Wegovy) 1.7 MG/0.75ML solution auto-injector; Inject 0.75 mL under the skin into the appropriate area as directed 1 (One) Time Per Week for 84 days.  Dispense: 9 mL; Refill: 0        We discussed the risks, benefits, and limitations of treatments. Continue medications and OTC supplements as discussed. Patient verbalizes understanding of and agreement with management plan.     Follow Up   Return in about 4 weeks (around 7/22/2024).    Patient was given instructions and counseling regarding her condition or for health maintenance advice. Please see specific information pulled into the AVS if appropriate.     I spent 30 minutes on this date of service. This time includes time spent by me in the following activities:preparing for the visit, counseling and educating the patient/family/caregiver, ordering medications, tests, or procedures and documenting information in the medical record.    Yojana Nelson, APRN  06/24/2024

## 2024-06-24 ENCOUNTER — OFFICE VISIT (OUTPATIENT)
Dept: BARIATRICS/WEIGHT MGMT | Facility: CLINIC | Age: 64
End: 2024-06-24
Payer: COMMERCIAL

## 2024-06-24 VITALS
SYSTOLIC BLOOD PRESSURE: 122 MMHG | HEART RATE: 91 BPM | HEIGHT: 61 IN | DIASTOLIC BLOOD PRESSURE: 80 MMHG | WEIGHT: 132 LBS | BODY MASS INDEX: 24.92 KG/M2

## 2024-06-24 DIAGNOSIS — Z76.89 ENCOUNTER FOR WEIGHT MANAGEMENT: Primary | ICD-10-CM

## 2024-06-24 PROCEDURE — 99214 OFFICE O/P EST MOD 30 MIN: CPT | Performed by: NURSE PRACTITIONER

## 2024-06-24 RX ORDER — SEMAGLUTIDE 1.7 MG/.75ML
1.7 INJECTION, SOLUTION SUBCUTANEOUS WEEKLY
Qty: 9 ML | Refills: 0 | Status: SHIPPED | OUTPATIENT
Start: 2024-06-24 | End: 2024-09-16

## 2024-06-24 NOTE — ASSESSMENT & PLAN NOTE
Patient's (Body mass index is 24.94 kg/m².) indicates that they are overweight with health conditions that include dyslipidemias . Weight is improving with treatment. BMI is is above average; BMI management plan is completed. We discussed low calorie, low carb based diet program, portion control, increasing exercise, pharmacologic options including Wegovy, and an dago-based approach such as [employer] Pal or Lose It.     -This is a follow-up visit and patient is down 2.4 pounds since last being seen approximately 2 months ago.  -At last visit she titrated from 2.4 mg Wegovy to 1.7 mg Wegovy.  Plan to stay at 1.7 mg for the least the next 2 months.  Body composition was reviewed and she is within the healthy fat mass and percentage for someone her age and sex but on the upper end of healthy.  She has desire to be a bit more on the middle to lower end of healthy fat percentage.  - Recent labs reviewed and A1c, lipid and CMP.  -She is working her way up to mindful movement post radiation.  Keep up this great effort.

## 2024-07-24 DIAGNOSIS — Z76.89 ENCOUNTER FOR WEIGHT MANAGEMENT: ICD-10-CM

## 2024-07-24 NOTE — TELEPHONE ENCOUNTER
Rx Refill Note  Requested Prescriptions     Pending Prescriptions Disp Refills    Semaglutide-Weight Management (Wegovy) 1.7 MG/0.75ML solution auto-injector 9 mL 0     Sig: Inject 0.75 mL under the skin into the appropriate area as directed 1 (One) Time Per Week for 84 days.      Last office visit with prescribing clinician: 6/24/2024   Last telemedicine visit with prescribing clinician: 2/29/2024   Next office visit with prescribing clinician: 8/26/2024                         Would you like a call back once the refill request has been completed: [] Yes [] No    If the office needs to give you a call back, can they leave a voicemail: [] Yes [] No    Jannette Baltazar MA  07/24/24, 15:39 EDT

## 2024-07-25 RX ORDER — SEMAGLUTIDE 1.7 MG/.75ML
1.7 INJECTION, SOLUTION SUBCUTANEOUS WEEKLY
Qty: 9 ML | Refills: 0 | Status: SHIPPED | OUTPATIENT
Start: 2024-07-25 | End: 2024-10-17

## 2024-08-22 NOTE — PROGRESS NOTES
[facility]  [address]  [place] [address]     Office Note      Date: 2024  Patient Name: Filomena Nixon  MRN: [id number]  : 1960    Subjective     Chief Complaint  Obesity Management follow-up    Filomena Nixon presents to [facility] for obesity management.     Patient is satisfied with weight loss progress. Appetite is moderately controlled. Reports no side effects of prescribed medications today. The patient is taking multivitamin and is taking fish oil.  The patient is not using a food journal.      The patient is exercising with a FITT score of:    Frequency Intensity Time Strength Training   []   0, none []   0 []   0 [x]   0   []   1 (1-2x/week) [x]   1 (light) []   1 (<10 min) []   1 (1x/week)   []   2 (3-5x/week) []   2 (moderate) []   2 (10-20 min) []   2 (2x/week)   [x]   3 (daily) []   3 (moderately hard)  []   4 (very hard) [x]   3 (20-30 min)  []   4 (>30 min) []   3 (3-4x/week)     Review of Systems   Constitutional:  Negative for appetite change and fatigue.   Eyes:  Negative for visual disturbance.   Cardiovascular:  Negative for chest pain and palpitations.   Gastrointestinal:  Negative for constipation and indigestion.   Neurological:  Negative for light-headedness.     Objective   Start weight: 169.8 pounds.    Total Loss lb/%Loss of beginning body weight (BBW): -39 lb/-23 %  Change in weight since last visit: -1.2 lb    Recent Weight History:   Wt Readings from Last 6 Encounters:   24 59.3 kg (130 lb 12.8 oz)   24 59.9 kg (132 lb)   24 59.9 kg (132 lb)   24 59.9 kg (132 lb)   24 61 kg (134 lb 6.4 oz)   24 61.3 kg (135 lb 1.6 oz)     Body mass index is 24.71 kg/m².   Body composition analysis completed and showed:   Body Fat %: 33.0    Measurements (in inches)  Waist Circumference: 35.5    Vital Signs:   /60 (BP Location: Left arm, Patient Position: Sitting)    "Pulse 93   Ht 154.9 cm (61\")   Wt 59.3 kg (130 lb 12.8 oz)   BMI 24.71 kg/m²       Physical Exam  Vitals reviewed.   Constitutional:       Appearance: She is well-developed.      Comments: overweight   Pulmonary:      Effort: Pulmonary effort is normal.   Neurological:      Mental Status: She is alert.   Psychiatric:         Attention and Perception: Attention normal.         Mood and Affect: Mood normal.         Speech: Speech normal.         Behavior: Behavior normal.        Result Review :     Common labs          5/1/2024    09:27   Common Labs   Glucose 87    BUN 14    Creatinine 0.81    Sodium 141    Potassium 4.0    Chloride 103    Calcium 10.1    Albumin 4.6    Total Bilirubin 1.1    Alkaline Phosphatase 73    AST (SGOT) 22    ALT (SGPT) 17    Total Cholesterol 183    Triglycerides 80    HDL Cholesterol 74    LDL Cholesterol  94    Hemoglobin A1C 4.90               Assessment / Plan        Diagnoses and all orders for this visit:    1. Encounter for weight management  Assessment & Plan:  This is a follow-up visit and patient is in maintenance phase.  Body composition was reviewed and still within a healthy weight and fat percentage for someone her age and sex.  Continue maintenance of Wegovy 1.7 mg, 3-month refill sent in.  A1c ordered to be drawn prior to her next office visit.    Orders:  -     Semaglutide-Weight Management (Wegovy) 1.7 MG/0.75ML solution auto-injector; Inject 0.75 mL under the skin into the appropriate area as directed 1 (One) Time Per Week for 84 days.  Dispense: 9 mL; Refill: 0  -     Hemoglobin A1c; Future        We discussed the risks, benefits, and limitations of treatments. Continue medications and OTC supplements as discussed. Patient verbalizes understanding of and agreement with management plan.     Follow Up   Return in about 4 weeks (around 9/23/2024).  Patient was given instructions and counseling regarding her condition or for health maintenance advice. Please see specific " information pulled into the AVS if appropriate.     I spent 30 minutes on this date of service. This time includes time spent by me in the following activities:preparing for the visit, counseling and educating the patient/family/caregiver, ordering medications, tests, or procedures and documenting information in the medical record.    Yojana Nelson, JANETTE  08/26/2024

## 2024-08-26 ENCOUNTER — OFFICE VISIT (OUTPATIENT)
Dept: BARIATRICS/WEIGHT MGMT | Facility: CLINIC | Age: 64
End: 2024-08-26
Payer: COMMERCIAL

## 2024-08-26 VITALS
BODY MASS INDEX: 24.7 KG/M2 | HEIGHT: 61 IN | SYSTOLIC BLOOD PRESSURE: 104 MMHG | HEART RATE: 93 BPM | WEIGHT: 130.8 LBS | DIASTOLIC BLOOD PRESSURE: 60 MMHG

## 2024-08-26 DIAGNOSIS — Z76.89 ENCOUNTER FOR WEIGHT MANAGEMENT: ICD-10-CM

## 2024-08-26 PROCEDURE — 99214 OFFICE O/P EST MOD 30 MIN: CPT | Performed by: NURSE PRACTITIONER

## 2024-08-26 RX ORDER — SEMAGLUTIDE 1.7 MG/.75ML
1.7 INJECTION, SOLUTION SUBCUTANEOUS WEEKLY
Qty: 9 ML | Refills: 0 | Status: SHIPPED | OUTPATIENT
Start: 2024-08-26 | End: 2024-11-18

## 2024-08-26 NOTE — ASSESSMENT & PLAN NOTE
This is a follow-up visit and patient is in maintenance phase.  Body composition was reviewed and still within a healthy weight and fat percentage for someone her age and sex.  Continue maintenance of Wegovy 1.7 mg, 3-month refill sent in.  A1c ordered to be drawn prior to her next office visit.

## 2024-09-11 ENCOUNTER — OFFICE VISIT (OUTPATIENT)
Dept: ORTHOPEDIC SURGERY | Facility: CLINIC | Age: 64
End: 2024-09-11
Payer: COMMERCIAL

## 2024-09-11 VITALS — WEIGHT: 130.73 LBS | BODY MASS INDEX: 24.68 KG/M2 | HEIGHT: 61 IN

## 2024-09-11 DIAGNOSIS — M77.42 METATARSALGIA OF LEFT FOOT: Primary | ICD-10-CM

## 2024-09-11 PROCEDURE — 99213 OFFICE O/P EST LOW 20 MIN: CPT | Performed by: ORTHOPAEDIC SURGERY

## 2024-09-11 NOTE — PROGRESS NOTES
ESTABLISHED PATIENT    Patient: Filomena Nixon  : 1960    Primary Care Provider: Kaushal Ledbetter MD    Requesting Provider: As above    Follow-up (3 month recheck- Metatarsalgia of left foot)      History    Chief Complaint: Left foot pain    History of Present Illness: The patient returns noting continued pain in the left forefoot.  She has been wearing the Budin splint.  She does have custom orthotics but they are somewhat old.  Her  is with her.  She has not noted any deformity of the toes.  She notes that the injection we did at the last visit helped for a week or 2.    Current Outpatient Medications on File Prior to Visit   Medication Sig Dispense Refill    aspirin 81 MG EC tablet Daily.      atorvastatin (LIPITOR) 10 MG tablet Take 1 tablet by mouth every night at bedtime.      Cholecalciferol (Vitamin D3) 1.25 MG (87300 UT) capsule Take 1 capsule by mouth Daily.      etodolac (LODINE) 500 MG tablet Take 1 tablet by mouth 2 (two) times a day.      MULTIPLE VITAMIN PO Multiple Vitamin capsule   Daily      Omega-3 Fatty Acids (FISH OIL) 1000 MG capsule capsule Take 1 capsule by mouth Daily With Breakfast.      OMEPRAZOLE PO Take 20 mg by mouth Daily.      Psyllium (GENFIBER PO) Genfiber (psyllium-dextrose) oral powder   As Directed      Semaglutide-Weight Management (Wegovy) 1.7 MG/0.75ML solution auto-injector Inject 0.75 mL under the skin into the appropriate area as directed 1 (One) Time Per Week for 84 days. 9 mL 0    sertraline (ZOLOFT) 100 MG tablet Take 1 tablet by mouth Daily.      TURMERIC PO Take  by mouth.       Current Facility-Administered Medications on File Prior to Visit   Medication Dose Route Frequency Provider Last Rate Last Admin    cyanocobalamin injection 1,000 mcg  1,000 mcg Intramuscular Q28 Days Yojana Nelson APRN   1,000 mcg at 23 7417      Allergies   Allergen Reactions    Hydrocodone-Acetaminophen Itching    Codeine Hives and Itching    Morphine Unknown  "- Low Severity      Past Medical History:   Diagnosis Date    Arthritis     Breast cancer 02/01/2024    CTS (carpal tunnel syndrome) 2019    Both wrists repaired    Heart burn     Hyperlipemia     Knee swelling 2012    Obstructive sleep apnea     Osteoarthritis     PCOS (polycystic ovarian syndrome)     Prediabetes     Tear of meniscus of knee     Right knee Repaired 2021     Past Surgical History:   Procedure Laterality Date    CARPAL TUNNEL RELEASE      HAND SURGERY      HYSTERECTOMY      KNEE SURGERY Left     arthroscopy     Family History   Problem Relation Age of Onset    Osteoarthritis Mother     Diabetes Mother     Hypertension Father       Social History     Socioeconomic History    Marital status:    Tobacco Use    Smoking status: Never     Passive exposure: Past    Smokeless tobacco: Never   Vaping Use    Vaping status: Never Used   Substance and Sexual Activity    Alcohol use: Yes     Alcohol/week: 4.0 standard drinks of alcohol     Types: 4 Glasses of wine per week    Drug use: No    Sexual activity: Yes     Partners: Male     Birth control/protection: Bilateral salpingectomy , Hysterectomy        Review of Systems   Constitutional: Negative.    HENT: Negative.     Eyes: Negative.    Respiratory: Negative.     Cardiovascular: Negative.    Gastrointestinal: Negative.    Endocrine: Negative.    Genitourinary: Negative.    Musculoskeletal:  Positive for arthralgias.   Skin: Negative.    Allergic/Immunologic: Negative.    Neurological: Negative.    Hematological: Negative.    Psychiatric/Behavioral: Negative.         The following portions of the patient's history were reviewed and updated as appropriate: allergies, current medications, past family history, past medical history, past social history, past surgical history, and problem list.    Physical Exam:   Ht 154.9 cm (60.98\")   Wt 59.3 kg (130 lb 11.7 oz)   BMI 24.71 kg/m²     Tender in the left foot under the second and third metatarsal heads, " but no significant hammertoe deformity, no significant swelling today    Medical Decision Making    Data Review:   none    Assessment/Plan/Diagnosis/Treatment Options:   1. Metatarsalgia of left foot  She has persistent metatarsalgia.  I explained that sometimes it can take a year for this to resolve.  Given that there is no deformity I do not have any surgery that would be helpful.  I do think some new custom orthotics would be useful and I gave her prescription.  I also think therapy will help.  Strengthening the intrinsic muscles of the foot can help as well as stretching the Achilles and the gastroc and the hamstrings.  We talked about managing the issue.  She is somewhat disappointed that it has not resolved.  I will see her again in 3 to 4 months standing 2 views of the foot.  - Ambulatory Referral to Physical Therapy for Evaluation & Treatment        Lor Castellanos MD

## 2024-10-02 ENCOUNTER — TELEPHONE (OUTPATIENT)
Dept: ORTHOPEDIC SURGERY | Facility: CLINIC | Age: 64
End: 2024-10-02
Payer: COMMERCIAL

## 2024-10-02 NOTE — TELEPHONE ENCOUNTER
[facility] received a fax that requires your attention. The document has been indexed to the patient’s chart for your review.      Reason for sending: RECEIVED PLAN OF CARE FROM [facility] THAT NEEDS TO BE SIGNED BY THE PROVIDER    Documents Description: PLAN OF CARE-[facility]-9/25/2024    Name of Sender: [facility]    Date Indexed: 10/2/2024

## 2024-10-18 DIAGNOSIS — Z76.89 ENCOUNTER FOR WEIGHT MANAGEMENT: ICD-10-CM

## 2024-10-21 DIAGNOSIS — Z76.89 ENCOUNTER FOR WEIGHT MANAGEMENT: ICD-10-CM

## 2024-10-21 RX ORDER — SEMAGLUTIDE 1.7 MG/.75ML
1.7 INJECTION, SOLUTION SUBCUTANEOUS WEEKLY
Qty: 9 ML | Refills: 0 | Status: SHIPPED | OUTPATIENT
Start: 2024-10-21 | End: 2025-01-13

## 2024-12-18 ENCOUNTER — PATIENT ROUNDING (BHMG ONLY) (OUTPATIENT)
Dept: URGENT CARE | Facility: CLINIC | Age: 64
End: 2024-12-18
Payer: COMMERCIAL

## 2024-12-18 NOTE — ED NOTES
Thank you for letting us care for you in your recent visit to our urgent care center. We would love to hear about your experience with us. Was this the first time you have visited our location?    We’re always looking for ways to make our patients’ experiences even better. Do you have any recommendations on ways we may improve?     I appreciate you taking the time to respond. Please be on the lookout for a survey about your recent visit from [facility] via text or email. We would greatly appreciate if you could fill that out and turn it back in. We want your voice to be heard and we value your feedback.   Thank you for choosing [facility] for your healthcare needs.

## 2024-12-22 NOTE — PROGRESS NOTES
[facility]  [address]  [place] [address]     Office Note      Date: 2024  Patient Name: Filomena Nixon  MRN: [id number]  : 1960    Subjective     Chief Complaint  Obesity Management follow-up    Filomena Nixon presents to [facility] for obesity management.     Patient is satisfied with weight loss progress. Appetite is moderately controlled. Reports no side effects of prescribed medications today. The patient is taking multivitamin and is taking fish oil.  The patient is not using a food journal.  The patient rates current efforts as 10 out of 10. Reports external labs were completed from from PCP. Taking 1.7mg Wegovy, denies all side effects. Desires to continue     The patient is exercising with a FITT score of:    Frequency Intensity Time Strength Training   []   0, none []   0 []   0 [x]   0   []   1 (1-2x/week) [x]   1 (light) []   1 (<10 min) []   1 (1x/week)   []   2 (3-5x/week) []   2 (moderate) []   2 (10-20 min) []   2 (2x/week)   [x]   3 (daily) []   3 (moderately hard)  []   4 (very hard) [x]   3 (20-30 min)  [x]   4 (>30 min) []   3 (3-4x/week)     Review of Systems   Constitutional:  Negative for appetite change and fatigue.   Eyes:  Negative for visual disturbance.   Cardiovascular:  Negative for chest pain and palpitations.   Gastrointestinal:  Negative for constipation and indigestion.   Neurological:  Negative for light-headedness.   All other systems reviewed and are negative.      Objective   Start weight: 169.8 pounds.    Total Loss lb/%Loss of beginning body weight (BBW): -38.2lb/-22.5%  Change in weight since last visit: +0.8    Recent Weight History:   Wt Readings from Last 6 Encounters:   24 59.7 kg (131 lb 9.6 oz)   24 59.3 kg (130 lb 11.7 oz)   24 59.3 kg (130 lb 11.7 oz)   24 59.3 kg (130 lb 12.8 oz)   24 59.9 kg (132 lb)   24 59.9 kg (132 lb)     Body mass  "index is 24.87 kg/m².   Body composition analysis completed and showed:   Body Fat %: 32.6    Measurements (in inches)  Waist Circumference: 35.5    Vital Signs:   /76 (BP Location: Left arm, Patient Position: Sitting)   Pulse 95   Ht 154.9 cm (61\")   Wt 59.7 kg (131 lb 9.6 oz)   BMI 24.87 kg/m²       Physical Exam   Result Review :     Common labs          5/1/2024    09:27   Common Labs   Glucose 87    BUN 14    Creatinine 0.81    Sodium 141    Potassium 4.0    Chloride 103    Calcium 10.1    Albumin 4.6    Total Bilirubin 1.1    Alkaline Phosphatase 73    AST (SGOT) 22    ALT (SGPT) 17    Total Cholesterol 183    Triglycerides 80    HDL Cholesterol 74    LDL Cholesterol  94    Hemoglobin A1C 4.90          Assessment / Plan        Diagnoses and all orders for this visit:    1. Encounter for weight management (Primary)  Assessment & Plan:  This is a follow-up visit and she is approximately the same weight as she was 3 months ago.  She is at maintenance level and continues Wegovy 1.7 mg with no adverse effects.  Reviewed external labs including A1c, lipid, CMP.  Continue to follow-up every 3 months. Discussed possible reduction in future is a possibility.     Orders:  -     Semaglutide-Weight Management (Wegovy) 1.7 MG/0.75ML solution auto-injector; Inject 0.75 mL under the skin into the appropriate area as directed 1 (One) Time Per Week for 84 days.  Dispense: 9 mL; Refill: 0    2. Encounter for long-term current use of medication  -     Semaglutide-Weight Management (Wegovy) 1.7 MG/0.75ML solution auto-injector; Inject 0.75 mL under the skin into the appropriate area as directed 1 (One) Time Per Week for 84 days.  Dispense: 9 mL; Refill: 0      We discussed the risks, benefits, and limitations of treatments. Continue medications and OTC supplements as discussed. Patient verbalizes understanding of and agreement with management plan.     Follow Up   Return in about 4 weeks (around 1/20/2025).  Patient was " given instructions and counseling regarding her condition or for health maintenance advice. Please see specific information pulled into the AVS if appropriate.     I spent 30 minutes on this date of service. This time includes time spent by me in the following activities:preparing for the visit, counseling and educating the patient/family/caregiver, ordering medications, tests, or procedures and documenting information in the medical record.    Yojana Nelson, APRN  12/23/2024

## 2024-12-23 ENCOUNTER — OFFICE VISIT (OUTPATIENT)
Dept: BARIATRICS/WEIGHT MGMT | Facility: CLINIC | Age: 64
End: 2024-12-23
Payer: COMMERCIAL

## 2024-12-23 VITALS
WEIGHT: 131.6 LBS | DIASTOLIC BLOOD PRESSURE: 76 MMHG | HEART RATE: 95 BPM | HEIGHT: 61 IN | BODY MASS INDEX: 24.84 KG/M2 | SYSTOLIC BLOOD PRESSURE: 114 MMHG

## 2024-12-23 DIAGNOSIS — Z76.89 ENCOUNTER FOR WEIGHT MANAGEMENT: Primary | ICD-10-CM

## 2024-12-23 DIAGNOSIS — Z79.899 ENCOUNTER FOR LONG-TERM CURRENT USE OF MEDICATION: ICD-10-CM

## 2024-12-23 PROCEDURE — 99214 OFFICE O/P EST MOD 30 MIN: CPT | Performed by: NURSE PRACTITIONER

## 2024-12-23 RX ORDER — SEMAGLUTIDE 1.7 MG/.75ML
1.7 INJECTION, SOLUTION SUBCUTANEOUS WEEKLY
Qty: 9 ML | Refills: 0 | Status: SHIPPED | OUTPATIENT
Start: 2024-12-23 | End: 2025-03-17

## 2024-12-23 NOTE — ASSESSMENT & PLAN NOTE
This is a follow-up visit and she is approximately the same weight as she was 3 months ago.  She is at maintenance level and continues Wegovy 1.7 mg with no adverse effects.  Reviewed external labs including A1c, lipid, CMP.  Continue to follow-up every 3 months. Discussed possible reduction in future is a possibility.

## 2025-01-03 DIAGNOSIS — Z76.89 ENCOUNTER FOR WEIGHT MANAGEMENT: ICD-10-CM

## 2025-01-03 DIAGNOSIS — Z79.899 ENCOUNTER FOR LONG-TERM CURRENT USE OF MEDICATION: ICD-10-CM

## 2025-01-03 NOTE — TELEPHONE ENCOUNTER
Rx Refill Note  Requested Prescriptions     Pending Prescriptions Disp Refills    Semaglutide-Weight Management (Wegovy) 1.7 MG/0.75ML solution auto-injector 9 mL 0     Sig: Inject 0.75 mL under the skin into the appropriate area as directed 1 (One) Time Per Week for 84 days.      Last office visit with prescribing clinician: 12/23/2024   Last telemedicine visit with prescribing clinician: Visit date not found   Next office visit with prescribing clinician: 3/24/2025     Requesting 90 day fill

## 2025-01-05 ENCOUNTER — PATIENT MESSAGE (OUTPATIENT)
Dept: BARIATRICS/WEIGHT MGMT | Facility: CLINIC | Age: 65
End: 2025-01-05
Payer: COMMERCIAL

## 2025-01-07 ENCOUNTER — PRIOR AUTHORIZATION (OUTPATIENT)
Dept: BARIATRICS/WEIGHT MGMT | Facility: CLINIC | Age: 65
End: 2025-01-07
Payer: COMMERCIAL

## 2025-01-07 RX ORDER — SEMAGLUTIDE 1.7 MG/.75ML
1.7 INJECTION, SOLUTION SUBCUTANEOUS WEEKLY
Qty: 9 ML | Refills: 0 | OUTPATIENT
Start: 2025-01-07 | End: 2025-04-01

## 2025-01-07 NOTE — TELEPHONE ENCOUNTER
Filomena Nixon (العراقي: [id number])  Wegovy 1.7MG/0.75ML auto-injectors  [facility] Electronic PA Form (2017 NCPDP)      PA resolved, no further action required- Spoke with pharmacy and patients out of pocket is $783.  I sent patient a message to contact her insurance to see if she needs to meet a deductible or if her medication has been moved to a new tier.    Also advised that she could try the Wegovy savngs card to see if it would help.

## 2025-01-09 DIAGNOSIS — Z79.899 ENCOUNTER FOR LONG-TERM CURRENT USE OF MEDICATION: ICD-10-CM

## 2025-01-09 DIAGNOSIS — Z76.89 ENCOUNTER FOR WEIGHT MANAGEMENT: ICD-10-CM

## 2025-01-09 RX ORDER — SEMAGLUTIDE 1.7 MG/.75ML
1.7 INJECTION, SOLUTION SUBCUTANEOUS WEEKLY
Qty: 9 ML | Refills: 0 | Status: SHIPPED | OUTPATIENT
Start: 2025-01-09 | End: 2025-04-03

## 2025-03-18 NOTE — PROGRESS NOTES
[facility]  [address]  [place] [address]     Office Note      Date: 2025  Patient Name: Filomena Nixon  MRN: [id number]  : 1960    Subjective     Chief Complaint  Obesity Management follow-up    Filomena Nixon presents to [facility] for obesity management.     Patient is satisfied with weight loss progress. Appetite is moderately controlled. Reports no side effects of prescribed medications today. The patient is taking multivitamin and is taking fish oil.  The patient is not using a food journal.  The patient rates current efforts as 8 out of 10.    The patient is exercising with a FITT score of:    Frequency Intensity Time Strength Training   []   0, none []   0 []   0 [x]   0   []   1 (1-2x/week) [x]   1 (light) []   1 (<10 min) []   1 (1x/week)   [x]   2 (3-5x/week) []   2 (moderate) []   2 (10-20 min) []   2 (2x/week)   []   3 (daily) []   3 (moderately hard)  []   4 (very hard) [x]   3 (20-30 min)  []   4 (>30 min) []   3 (3-4x/week)     Review of Systems   Constitutional:  Negative for appetite change and fatigue.   Eyes:  Negative for visual disturbance.   Cardiovascular:  Negative for chest pain and palpitations.   Gastrointestinal:  Negative for constipation and indigestion.   Neurological:  Negative for light-headedness.   All other systems reviewed and are negative.    Objective   Start weight: 169.8 pounds.    Total Loss lb/%Loss of beginning body weight (BBW): -38.8lb/-22.85%  Change in weight since last visit: -0.6    Recent Weight History:   Wt Readings from Last 6 Encounters:   25 59.4 kg (131 lb)   24 59.7 kg (131 lb 9.6 oz)   24 59.3 kg (130 lb 11.7 oz)   24 59.3 kg (130 lb 11.7 oz)   24 59.3 kg (130 lb 12.8 oz)   24 59.9 kg (132 lb)     Body mass index is 24.75 kg/m².   Body composition analysis completed and showed:   Body Fat %: 28    Measurements (in inches)  Waist  "Circumference: 34.5    Vital Signs:   /60 (BP Location: Left arm, Patient Position: Sitting)   Pulse 87   Ht 154.9 cm (61\")   Wt 59.4 kg (131 lb)   BMI 24.75 kg/m²       Physical Exam  Vitals reviewed.   Constitutional:       Appearance: She is well-developed.   Pulmonary:      Effort: Pulmonary effort is normal.   Neurological:      Mental Status: She is alert.   Psychiatric:         Attention and Perception: Attention normal.         Mood and Affect: Mood normal.         Speech: Speech normal.         Behavior: Behavior normal.      Result Review :     Common labs          5/1/2024    09:27   Common Labs   Glucose 87    BUN 14    Creatinine 0.81    Sodium 141    Potassium 4.0    Chloride 103    Calcium 10.1    Albumin 4.6    Total Bilirubin 1.1    Alkaline Phosphatase 73    AST (SGOT) 22    ALT (SGPT) 17    Total Cholesterol 183    Triglycerides 80    HDL Cholesterol 74    LDL Cholesterol  94    Hemoglobin A1C 4.90          Assessment / Plan        Diagnoses and all orders for this visit:    1. Encounter for weight management (Primary)  Assessment & Plan:  This is a follow-up visit and patient is approximately the same weight as she was around 3 months ago.  Currently is in maintenance phase and taking Wegovy 1.7 mg and tolerating well.  We will space visits out to every 6 months if tolerated.  A1c and CMP ordered to be completed in a couple months prior to next office visit.    Orders:  -     Semaglutide-Weight Management (Wegovy) 1.7 MG/0.75ML solution auto-injector; Inject 0.75 mL under the skin into the appropriate area as directed 1 (One) Time Per Week for 84 days.  Dispense: 9 mL; Refill: 1  -     Hemoglobin A1c; Future  -     Comprehensive Metabolic Panel; Future    2. Encounter for long-term current use of medication  -     Semaglutide-Weight Management (Wegovy) 1.7 MG/0.75ML solution auto-injector; Inject 0.75 mL under the skin into the appropriate area as directed 1 (One) Time Per Week for 84 " days.  Dispense: 9 mL; Refill: 1  -     Hemoglobin A1c; Future  -     Comprehensive Metabolic Panel; Future      We discussed the risks, benefits, and limitations of treatments. Continue medications and OTC supplements as discussed. Patient verbalizes understanding of and agreement with management plan.     Follow Up   Return in about 6 months (around 9/24/2025).  Patient was given instructions and counseling regarding her condition or for health maintenance advice. Please see specific information pulled into the AVS if appropriate.     I spent 30 minutes on this date of service. This time includes time spent by me in the following activities:preparing for the visit, counseling and educating the patient/family/caregiver, ordering medications, tests, or procedures and documenting information in the medical record.    Yojana Nelson, APRN  03/24/2025

## 2025-03-24 ENCOUNTER — OFFICE VISIT (OUTPATIENT)
Dept: BARIATRICS/WEIGHT MGMT | Facility: CLINIC | Age: 65
End: 2025-03-24
Payer: COMMERCIAL

## 2025-03-24 VITALS
BODY MASS INDEX: 24.73 KG/M2 | HEIGHT: 61 IN | HEART RATE: 87 BPM | DIASTOLIC BLOOD PRESSURE: 60 MMHG | WEIGHT: 131 LBS | SYSTOLIC BLOOD PRESSURE: 106 MMHG

## 2025-03-24 DIAGNOSIS — Z76.89 ENCOUNTER FOR WEIGHT MANAGEMENT: Primary | ICD-10-CM

## 2025-03-24 DIAGNOSIS — Z79.899 ENCOUNTER FOR LONG-TERM CURRENT USE OF MEDICATION: ICD-10-CM

## 2025-03-24 PROCEDURE — 99214 OFFICE O/P EST MOD 30 MIN: CPT | Performed by: NURSE PRACTITIONER

## 2025-03-24 RX ORDER — SEMAGLUTIDE 1.7 MG/.75ML
1.7 INJECTION, SOLUTION SUBCUTANEOUS WEEKLY
Qty: 9 ML | Refills: 1 | Status: SHIPPED | OUTPATIENT
Start: 2025-03-24 | End: 2025-06-16

## 2025-05-22 ENCOUNTER — LAB (OUTPATIENT)
Dept: LAB | Facility: HOSPITAL | Age: 65
End: 2025-05-22
Payer: COMMERCIAL

## 2025-05-22 DIAGNOSIS — Z76.89 ENCOUNTER FOR WEIGHT MANAGEMENT: ICD-10-CM

## 2025-05-22 DIAGNOSIS — Z79.899 ENCOUNTER FOR LONG-TERM CURRENT USE OF MEDICATION: ICD-10-CM

## 2025-05-22 LAB
ALBUMIN SERPL-MCNC: 4.6 G/DL (ref 3.5–5.2)
ALBUMIN/GLOB SERPL: 2.2 G/DL
ALP SERPL-CCNC: 77 U/L (ref 39–117)
ALT SERPL W P-5'-P-CCNC: 17 U/L (ref 1–33)
ANION GAP SERPL CALCULATED.3IONS-SCNC: 11.6 MMOL/L (ref 5–15)
AST SERPL-CCNC: 22 U/L (ref 1–32)
BILIRUB SERPL-MCNC: 0.7 MG/DL (ref 0–1.2)
BUN SERPL-MCNC: 19 MG/DL (ref 8–23)
BUN/CREAT SERPL: 18.6 (ref 7–25)
CALCIUM SPEC-SCNC: 9.8 MG/DL (ref 8.6–10.5)
CHLORIDE SERPL-SCNC: 103 MMOL/L (ref 98–107)
CO2 SERPL-SCNC: 25.4 MMOL/L (ref 22–29)
CREAT SERPL-MCNC: 1.02 MG/DL (ref 0.57–1)
EGFRCR SERPLBLD CKD-EPI 2021: 61.6 ML/MIN/1.73
GLOBULIN UR ELPH-MCNC: 2.1 GM/DL
GLUCOSE SERPL-MCNC: 77 MG/DL (ref 65–99)
POTASSIUM SERPL-SCNC: 4 MMOL/L (ref 3.5–5.2)
PROT SERPL-MCNC: 6.7 G/DL (ref 6–8.5)
SODIUM SERPL-SCNC: 140 MMOL/L (ref 136–145)

## 2025-05-22 PROCEDURE — 36415 COLL VENOUS BLD VENIPUNCTURE: CPT

## 2025-05-22 PROCEDURE — 80053 COMPREHEN METABOLIC PANEL: CPT

## 2025-05-22 PROCEDURE — 83036 HEMOGLOBIN GLYCOSYLATED A1C: CPT

## 2025-05-23 LAB — HBA1C MFR BLD: 5.1 % (ref 4.8–5.6)

## 2025-06-25 DIAGNOSIS — Z76.89 ENCOUNTER FOR WEIGHT MANAGEMENT: ICD-10-CM

## 2025-06-25 DIAGNOSIS — Z79.899 ENCOUNTER FOR LONG-TERM CURRENT USE OF MEDICATION: ICD-10-CM

## 2025-06-25 RX ORDER — SEMAGLUTIDE 1.7 MG/.75ML
1.7 INJECTION, SOLUTION SUBCUTANEOUS WEEKLY
Qty: 9 ML | Refills: 1 | Status: CANCELLED | OUTPATIENT
Start: 2025-06-25 | End: 2025-09-17

## 2025-06-26 ENCOUNTER — PRIOR AUTHORIZATION (OUTPATIENT)
Dept: BARIATRICS/WEIGHT MGMT | Facility: CLINIC | Age: 65
End: 2025-06-26
Payer: COMMERCIAL

## 2025-06-26 DIAGNOSIS — Z76.89 ENCOUNTER FOR WEIGHT MANAGEMENT: ICD-10-CM

## 2025-06-26 DIAGNOSIS — Z79.899 ENCOUNTER FOR LONG-TERM CURRENT USE OF MEDICATION: ICD-10-CM

## 2025-06-26 RX ORDER — SEMAGLUTIDE 1.7 MG/.75ML
1.7 INJECTION, SOLUTION SUBCUTANEOUS WEEKLY
Qty: 9 ML | Refills: 1 | Status: SHIPPED | OUTPATIENT
Start: 2025-06-26 | End: 2025-09-18

## 2025-06-26 NOTE — TELEPHONE ENCOUNTER
Filomena Fossper (Key: [id number])  Wegovy 1.7MG/0.75ML auto-injectors  Form  [facility] Electronic PA Form (2017 NCPDP)    Message from plan: Your PA request has been approved. Additional information will be provided in the approval communication. (Message 1144). Authorization Expiration Date: June 26, 2026.

## 2025-08-25 ENCOUNTER — OFFICE VISIT (OUTPATIENT)
Dept: ORTHOPEDIC SURGERY | Facility: CLINIC | Age: 65
End: 2025-08-25
Payer: COMMERCIAL

## 2025-08-25 VITALS
HEIGHT: 61 IN | WEIGHT: 132.8 LBS | SYSTOLIC BLOOD PRESSURE: 120 MMHG | DIASTOLIC BLOOD PRESSURE: 70 MMHG | BODY MASS INDEX: 25.07 KG/M2

## 2025-08-25 DIAGNOSIS — M79.672 LEFT FOOT PAIN: ICD-10-CM

## 2025-08-25 DIAGNOSIS — M77.42 METATARSALGIA OF LEFT FOOT: Primary | ICD-10-CM

## 2025-08-25 PROCEDURE — 99213 OFFICE O/P EST LOW 20 MIN: CPT | Performed by: ORTHOPAEDIC SURGERY

## 2025-08-26 ENCOUNTER — TELEPHONE (OUTPATIENT)
Dept: ORTHOPEDIC SURGERY | Facility: CLINIC | Age: 65
End: 2025-08-26
Payer: COMMERCIAL